# Patient Record
Sex: MALE | Race: WHITE | NOT HISPANIC OR LATINO | Employment: FULL TIME | RURAL
[De-identification: names, ages, dates, MRNs, and addresses within clinical notes are randomized per-mention and may not be internally consistent; named-entity substitution may affect disease eponyms.]

---

## 2021-04-22 ENCOUNTER — OFFICE VISIT (OUTPATIENT)
Dept: ORTHOPEDICS | Facility: CLINIC | Age: 61
End: 2021-04-22
Payer: COMMERCIAL

## 2021-04-22 DIAGNOSIS — M19.011 OSTEOARTHRITIS OF RIGHT GLENOHUMERAL JOINT: Primary | ICD-10-CM

## 2021-04-22 DIAGNOSIS — M19.012 OSTEOARTHRITIS OF LEFT SHOULDER, UNSPECIFIED OSTEOARTHRITIS TYPE: ICD-10-CM

## 2021-04-22 DIAGNOSIS — M25.512 LEFT SHOULDER PAIN: ICD-10-CM

## 2021-04-22 PROCEDURE — 99213 PR OFFICE/OUTPT VISIT, EST, LEVL III, 20-29 MIN: ICD-10-PCS | Mod: 25,,, | Performed by: ORTHOPAEDIC SURGERY

## 2021-04-22 PROCEDURE — 20610 DRAIN/INJ JOINT/BURSA W/O US: CPT | Mod: LT,,, | Performed by: ORTHOPAEDIC SURGERY

## 2021-04-22 PROCEDURE — 99213 OFFICE O/P EST LOW 20 MIN: CPT | Mod: 25,,, | Performed by: ORTHOPAEDIC SURGERY

## 2021-04-22 PROCEDURE — 20610 LARGE JOINT ASPIRATION/INJECTION: L GLENOHUMERAL: ICD-10-PCS | Mod: LT,,, | Performed by: ORTHOPAEDIC SURGERY

## 2021-04-22 RX ORDER — BUPIVACAINE HYDROCHLORIDE 5 MG/ML
3 INJECTION, SOLUTION PERINEURAL
Status: DISCONTINUED | OUTPATIENT
Start: 2021-04-22 | End: 2021-04-22 | Stop reason: HOSPADM

## 2021-04-22 RX ORDER — TRIAMCINOLONE ACETONIDE 40 MG/ML
40 INJECTION, SUSPENSION INTRA-ARTICULAR; INTRAMUSCULAR
Status: DISCONTINUED | OUTPATIENT
Start: 2021-04-22 | End: 2021-04-22 | Stop reason: HOSPADM

## 2021-04-22 RX ORDER — BUPIVACAINE HYDROCHLORIDE 5 MG/ML
4 INJECTION, SOLUTION PERINEURAL
Status: DISCONTINUED | OUTPATIENT
Start: 2021-04-22 | End: 2021-04-22 | Stop reason: HOSPADM

## 2021-04-22 RX ORDER — MULTIVITAMIN
1 TABLET ORAL DAILY
COMMUNITY

## 2021-04-22 RX ADMIN — BUPIVACAINE HYDROCHLORIDE 4 ML: 5 INJECTION, SOLUTION PERINEURAL at 08:04

## 2021-04-22 RX ADMIN — BUPIVACAINE HYDROCHLORIDE 3 ML: 5 INJECTION, SOLUTION PERINEURAL at 08:04

## 2021-04-22 RX ADMIN — TRIAMCINOLONE ACETONIDE 40 MG: 40 INJECTION, SUSPENSION INTRA-ARTICULAR; INTRAMUSCULAR at 08:04

## 2021-06-02 DIAGNOSIS — M25.512 LEFT SHOULDER PAIN: Primary | ICD-10-CM

## 2021-06-03 ENCOUNTER — OFFICE VISIT (OUTPATIENT)
Dept: ORTHOPEDICS | Facility: CLINIC | Age: 61
End: 2021-06-03
Payer: COMMERCIAL

## 2021-06-03 ENCOUNTER — HOSPITAL ENCOUNTER (OUTPATIENT)
Dept: RADIOLOGY | Facility: HOSPITAL | Age: 61
Discharge: HOME | End: 2021-06-03
Attending: ORTHOPAEDIC SURGERY
Payer: COMMERCIAL

## 2021-06-03 VITALS — BODY MASS INDEX: 29.82 KG/M2 | WEIGHT: 225 LBS | HEIGHT: 73 IN

## 2021-06-03 DIAGNOSIS — M25.511 RIGHT SHOULDER PAIN: ICD-10-CM

## 2021-06-03 DIAGNOSIS — M19.012 GLENOHUMERAL ARTHRITIS, LEFT: ICD-10-CM

## 2021-06-03 DIAGNOSIS — M25.512 LEFT SHOULDER PAIN: ICD-10-CM

## 2021-06-03 DIAGNOSIS — M75.100 TEAR OF ROTATOR CUFF, UNSPECIFIED LATERALITY, UNSPECIFIED TEAR EXTENT, UNSPECIFIED WHETHER TRAUMATIC: Primary | ICD-10-CM

## 2021-06-03 PROCEDURE — 73030 X-RAY EXAM OF SHOULDER: CPT | Mod: 26,LT,, | Performed by: ORTHOPAEDIC SURGERY

## 2021-06-03 PROCEDURE — 99214 OFFICE O/P EST MOD 30 MIN: CPT | Mod: ,,, | Performed by: ORTHOPAEDIC SURGERY

## 2021-06-03 PROCEDURE — 99214 PR OFFICE/OUTPT VISIT, EST, LEVL IV, 30-39 MIN: ICD-10-PCS | Mod: ,,, | Performed by: ORTHOPAEDIC SURGERY

## 2021-06-03 PROCEDURE — 73030 X-RAY EXAM OF SHOULDER: CPT | Mod: TC,LT

## 2021-06-03 PROCEDURE — 73030 XR SHOULDER COMPLETE 2 OR MORE VIEWS LEFT: ICD-10-PCS | Mod: 26,LT,, | Performed by: ORTHOPAEDIC SURGERY

## 2021-06-22 ENCOUNTER — OFFICE VISIT (OUTPATIENT)
Dept: ORTHOPEDICS | Facility: CLINIC | Age: 61
End: 2021-06-22
Payer: COMMERCIAL

## 2021-06-22 DIAGNOSIS — M75.100 TEAR OF ROTATOR CUFF, UNSPECIFIED LATERALITY, UNSPECIFIED TEAR EXTENT, UNSPECIFIED WHETHER TRAUMATIC: Primary | ICD-10-CM

## 2021-06-22 PROCEDURE — 99213 OFFICE O/P EST LOW 20 MIN: CPT | Mod: ,,, | Performed by: ORTHOPAEDIC SURGERY

## 2021-06-22 PROCEDURE — 99213 PR OFFICE/OUTPT VISIT, EST, LEVL III, 20-29 MIN: ICD-10-PCS | Mod: ,,, | Performed by: ORTHOPAEDIC SURGERY

## 2021-10-12 ENCOUNTER — OFFICE VISIT (OUTPATIENT)
Dept: ORTHOPEDICS | Facility: CLINIC | Age: 61
End: 2021-10-12
Payer: COMMERCIAL

## 2021-10-12 DIAGNOSIS — M19.011 OSTEOARTHRITIS OF RIGHT GLENOHUMERAL JOINT: ICD-10-CM

## 2021-10-12 DIAGNOSIS — M75.100 TEAR OF ROTATOR CUFF, UNSPECIFIED LATERALITY, UNSPECIFIED TEAR EXTENT, UNSPECIFIED WHETHER TRAUMATIC: Primary | ICD-10-CM

## 2021-10-12 PROCEDURE — 99213 OFFICE O/P EST LOW 20 MIN: CPT | Mod: ,,, | Performed by: ORTHOPAEDIC SURGERY

## 2021-10-12 PROCEDURE — 99213 PR OFFICE/OUTPT VISIT, EST, LEVL III, 20-29 MIN: ICD-10-PCS | Mod: ,,, | Performed by: ORTHOPAEDIC SURGERY

## 2021-10-12 PROCEDURE — 1159F PR MEDICATION LIST DOCUMENTED IN MEDICAL RECORD: ICD-10-PCS | Mod: CPTII,,, | Performed by: ORTHOPAEDIC SURGERY

## 2021-10-12 PROCEDURE — 1159F MED LIST DOCD IN RCRD: CPT | Mod: CPTII,,, | Performed by: ORTHOPAEDIC SURGERY

## 2021-10-12 PROCEDURE — 4010F ACE/ARB THERAPY RXD/TAKEN: CPT | Mod: CPTII,,, | Performed by: ORTHOPAEDIC SURGERY

## 2021-10-12 PROCEDURE — 4010F PR ACE/ARB THEARPY RXD/TAKEN: ICD-10-PCS | Mod: CPTII,,, | Performed by: ORTHOPAEDIC SURGERY

## 2021-10-19 ENCOUNTER — OFFICE VISIT (OUTPATIENT)
Dept: ORTHOPEDICS | Facility: CLINIC | Age: 61
End: 2021-10-19
Payer: COMMERCIAL

## 2021-10-19 DIAGNOSIS — M19.011 OSTEOARTHRITIS OF RIGHT GLENOHUMERAL JOINT: ICD-10-CM

## 2021-10-19 DIAGNOSIS — M75.100 TEAR OF ROTATOR CUFF, UNSPECIFIED LATERALITY, UNSPECIFIED TEAR EXTENT, UNSPECIFIED WHETHER TRAUMATIC: Primary | ICD-10-CM

## 2021-10-19 PROCEDURE — 20610 DRAIN/INJ JOINT/BURSA W/O US: CPT | Mod: LT,,, | Performed by: ORTHOPAEDIC SURGERY

## 2021-10-19 PROCEDURE — 20610 PR DRAIN/INJECT LARGE JOINT/BURSA: ICD-10-PCS | Mod: LT,,, | Performed by: ORTHOPAEDIC SURGERY

## 2021-10-19 PROCEDURE — 0232T NJX PLATELET PLASMA: CPT | Mod: 59,,, | Performed by: ORTHOPAEDIC SURGERY

## 2021-10-19 PROCEDURE — 0232T PR INJECT PLATELET PLASMA W/IMG HARVEST/PREPARATOIN: ICD-10-PCS | Mod: 59,,, | Performed by: ORTHOPAEDIC SURGERY

## 2021-10-19 RX ORDER — TRIAMCINOLONE ACETONIDE 40 MG/ML
40 INJECTION, SUSPENSION INTRA-ARTICULAR; INTRAMUSCULAR
Status: DISCONTINUED | OUTPATIENT
Start: 2021-10-19 | End: 2021-10-19 | Stop reason: HOSPADM

## 2021-10-19 RX ORDER — BUPIVACAINE HYDROCHLORIDE 5 MG/ML
5 INJECTION, SOLUTION PERINEURAL
Status: DISCONTINUED | OUTPATIENT
Start: 2021-10-19 | End: 2021-10-19 | Stop reason: HOSPADM

## 2021-10-19 RX ADMIN — BUPIVACAINE HYDROCHLORIDE 5 ML: 5 INJECTION, SOLUTION PERINEURAL at 10:10

## 2021-10-19 RX ADMIN — TRIAMCINOLONE ACETONIDE 40 MG: 40 INJECTION, SUSPENSION INTRA-ARTICULAR; INTRAMUSCULAR at 10:10

## 2022-04-29 DIAGNOSIS — M25.512 ACUTE PAIN OF LEFT SHOULDER: Primary | ICD-10-CM

## 2022-04-29 DIAGNOSIS — M79.672 PAIN OF LEFT HEEL: ICD-10-CM

## 2022-05-03 ENCOUNTER — HOSPITAL ENCOUNTER (OUTPATIENT)
Dept: RADIOLOGY | Facility: HOSPITAL | Age: 62
Discharge: HOME | End: 2022-05-03
Attending: ORTHOPAEDIC SURGERY
Payer: COMMERCIAL

## 2022-05-03 ENCOUNTER — OFFICE VISIT (OUTPATIENT)
Dept: ORTHOPEDICS | Facility: CLINIC | Age: 62
End: 2022-05-03
Payer: COMMERCIAL

## 2022-05-03 DIAGNOSIS — M72.2 PLANTAR FASCIITIS OF RIGHT FOOT: Primary | ICD-10-CM

## 2022-05-03 DIAGNOSIS — M25.512 ACUTE PAIN OF LEFT SHOULDER: ICD-10-CM

## 2022-05-03 DIAGNOSIS — M19.012 GLENOHUMERAL ARTHRITIS, LEFT: ICD-10-CM

## 2022-05-03 DIAGNOSIS — M79.672 PAIN OF LEFT HEEL: ICD-10-CM

## 2022-05-03 PROCEDURE — 73030 XR SHOULDER COMPLETE 2 OR MORE VIEWS LEFT: ICD-10-PCS | Mod: 26,LT,, | Performed by: ORTHOPAEDIC SURGERY

## 2022-05-03 PROCEDURE — 20610 DRAIN/INJ JOINT/BURSA W/O US: CPT | Mod: LT,,, | Performed by: ORTHOPAEDIC SURGERY

## 2022-05-03 PROCEDURE — 99215 OFFICE O/P EST HI 40 MIN: CPT | Mod: 25,,, | Performed by: ORTHOPAEDIC SURGERY

## 2022-05-03 PROCEDURE — 73650 XR CALCANEUS 2 VIEW RIGHT: ICD-10-PCS | Mod: 26,LT,, | Performed by: ORTHOPAEDIC SURGERY

## 2022-05-03 PROCEDURE — 20550 TENDON ORIGIN: ICD-10-PCS | Mod: 59,,, | Performed by: ORTHOPAEDIC SURGERY

## 2022-05-03 PROCEDURE — 99215 PR OFFICE/OUTPT VISIT, EST, LEVL V, 40-54 MIN: ICD-10-PCS | Mod: 25,,, | Performed by: ORTHOPAEDIC SURGERY

## 2022-05-03 PROCEDURE — 73030 X-RAY EXAM OF SHOULDER: CPT | Mod: TC,LT

## 2022-05-03 PROCEDURE — 20610 LARGE JOINT ASPIRATION/INJECTION: L GLENOHUMERAL: ICD-10-PCS | Mod: LT,,, | Performed by: ORTHOPAEDIC SURGERY

## 2022-05-03 PROCEDURE — 73650 X-RAY EXAM OF HEEL: CPT | Mod: TC,RT

## 2022-05-03 PROCEDURE — 20550 NJX 1 TENDON SHEATH/LIGAMENT: CPT | Mod: 59,,, | Performed by: ORTHOPAEDIC SURGERY

## 2022-05-03 PROCEDURE — 4010F PR ACE/ARB THEARPY RXD/TAKEN: ICD-10-PCS | Mod: CPTII,,, | Performed by: ORTHOPAEDIC SURGERY

## 2022-05-03 PROCEDURE — 73650 X-RAY EXAM OF HEEL: CPT | Mod: 26,LT,, | Performed by: ORTHOPAEDIC SURGERY

## 2022-05-03 PROCEDURE — 4010F ACE/ARB THERAPY RXD/TAKEN: CPT | Mod: CPTII,,, | Performed by: ORTHOPAEDIC SURGERY

## 2022-05-03 PROCEDURE — 73030 X-RAY EXAM OF SHOULDER: CPT | Mod: 26,LT,, | Performed by: ORTHOPAEDIC SURGERY

## 2022-05-03 RX ADMIN — BUPIVACAINE HYDROCHLORIDE 5 ML: 5 INJECTION, SOLUTION PERINEURAL at 02:05

## 2022-05-03 RX ADMIN — TRIAMCINOLONE ACETONIDE 20 MG: 40 INJECTION, SUSPENSION INTRA-ARTICULAR; INTRAMUSCULAR at 02:05

## 2022-05-03 RX ADMIN — TRIAMCINOLONE ACETONIDE 40 MG: 40 INJECTION, SUSPENSION INTRA-ARTICULAR; INTRAMUSCULAR at 02:05

## 2022-05-03 NOTE — PROGRESS NOTES
61 y.o. Male returns to clinic for a follow up visit regarding     ICD-10-CM ICD-9-CM   1. Plantar fasciitis of right foot  M72.2 728.71   2. Glenohumeral arthritis, left  M19.012 716.91        Pt is here for recheck left shoulder pain, he was given bilateral injection in October which did help for few months, he has had a PRP injection in his right shoulder which gave him good relief.  He is complaining of new onset right foot pain today.  Has been diagnosed with plantar fasciitis in the past.  Believes is likely a recurrence of his plantar fasciitis.       Past Medical History:   Diagnosis Date    High cholesterol     Hypertension     Thyroid cancer      Past Surgical History:   Procedure Laterality Date    THYROID SURGERY      TOTAL KNEE ARTHROPLASTY Right     TUMOR REMOVAL Left     left thigh         REVIEW OF SYSTEMS:   Constitution: Negative. Negative for chills, fever and night sweats.    Hematologic/Lymphatic: Negative for bleeding problem. Does not bruise/bleed easily.   Skin: Negative for dry skin, itching and rash.   Musculoskeletal: Negative for falls. Positive for knee pain and muscle weakness.   All other review of symptoms were reviewed and found to be noncontributory.     PHYSICAL EXAMINATION:  There were no vitals taken for this visit.  General    Constitutional: He is oriented to person, place, and time. He appears well-developed and well-nourished. No distress.   HENT:   Head: Normocephalic and atraumatic.   Eyes: Pupils are equal, round, and reactive to light.   Cardiovascular: Normal rate, regular rhythm and intact distal pulses.    Pulmonary/Chest: Effort normal. No respiratory distress. He exhibits no tenderness.   Abdominal: Soft. Bowel sounds are normal. He exhibits no distension. There is no abdominal tenderness.   Neurological: He is alert and oriented to person, place, and time. He has normal reflexes. He displays normal reflexes. He exhibits normal muscle tone.  Coordination normal.   Psychiatric: He has a normal mood and affect. His behavior is normal. Judgment and thought content normal.             Left Shoulder Exam     Tenderness   The patient is tender to palpation of the acromioclavicular joint and supraspinatus.    Tests & Signs   Drop arm: positive  Lag Sign 0 degrees: positive    Comments:  Patient demonstrates evidence of pseudoparalysis with limited active forward elevation      Muscle Strength   Left Upper Extremity  Shoulder Internal Rotation: 3/5   Shoulder External Rotation: 3/5   Supraspinatus: 3/5   Subscapularis: 3/5         IMAGING:  X-Ray Calcaneus 2 View Right    Result Date: 5/3/2022  See Procedure Notes for results. IMPRESSION: Please see Ortho procedure notes for report.  This procedure was auto-finalized by: Virtual Radiologist  Radiographs of the right foot were obtained today demonstrating no significant evidence of fracture dislocation or pathologic bone.  Mild spurring seen along the Achilles insertion and at the plantar fascia origin.      X-Ray Shoulder 2 or More Views Left    Result Date: 5/3/2022  See Procedure Notes for results. IMPRESSION: Please see Ortho procedure notes for report.  This procedure was auto-finalized by: Virtual Radiologist     Radiographs left shoulder obtained today demonstrating severe glenohumeral osteoarthritis with bone-on-bone degenerative changes of the glenohumeral joint.  ASSESSMENT:      ICD-10-CM ICD-9-CM   1. Plantar fasciitis of right foot  M72.2 728.71   2. Glenohumeral arthritis, left  M19.012 716.91       PLAN:     -Findings and treatment options were discussed with the patient  -All questions answered      We discussed surgery on his left shoulder.  This could be an anatomic shoulder replacement versus a reverse shoulder replacement.  This was to pinned on the glenoid morphology based on the CT scan.  He wishes to postpone shoulder surgery due to his dire need for lumbar spine surgery in the future.  We  treated his shoulder with a intra-articular injection today and his plantar fascia with an injection.  Please see the attached procedure note.  Will follow back up in the near future when he wishes to have his shoulder addressed via surgery.    Patient Instructions   CT scan left shoulder with blueprint protocol    Injection R foot today (plantar fasciitis        No orders of the defined types were placed in this encounter.        Large Joint Aspiration/Injection: L glenohumeral    Date/Time: 5/3/2022 2:00 PM  Performed by: Silvio Frye MD  Authorized by: Silvio Frye MD     Consent Done?:  Yes (Verbal)  Indications:  Pain  Timeout: prior to procedure the correct patient, procedure, and site was verified      Local anesthesia used?: Yes      Details:  Needle Size:  22 G  Approach:  Anterior  Location:  Shoulder  Site:  L glenohumeral  Medications:  5 mL BUPivacaine 0.5 % (5 mg/mL); 40 mg triamcinolone acetonide 40 mg/mL  Patient tolerance:  Patient tolerated the procedure well with no immediate complications    Tendon Origin    Date/Time: 5/3/2022 2:00 PM  Performed by: Silvio Frye MD  Authorized by: Silvio Frye MD     Needle size:  22 G  Medications:  20 mg triamcinolone acetonide 40 mg/mL   The right plantar fascia origin was injected with a 0.5 cc of 40 Kenalog and 0.5 cc of 0.25% bupivacaine.  This was done under sterile technique without complication.

## 2022-05-04 RX ORDER — TRIAMCINOLONE ACETONIDE 40 MG/ML
20 INJECTION, SUSPENSION INTRA-ARTICULAR; INTRAMUSCULAR
Status: DISCONTINUED | OUTPATIENT
Start: 2022-05-03 | End: 2022-05-04 | Stop reason: HOSPADM

## 2022-05-04 RX ORDER — BUPIVACAINE HYDROCHLORIDE 5 MG/ML
5 INJECTION, SOLUTION PERINEURAL
Status: DISCONTINUED | OUTPATIENT
Start: 2022-05-03 | End: 2022-05-04 | Stop reason: HOSPADM

## 2022-05-04 RX ORDER — TRIAMCINOLONE ACETONIDE 40 MG/ML
40 INJECTION, SUSPENSION INTRA-ARTICULAR; INTRAMUSCULAR
Status: DISCONTINUED | OUTPATIENT
Start: 2022-05-03 | End: 2022-05-04 | Stop reason: HOSPADM

## 2022-09-28 DIAGNOSIS — M25.512 BILATERAL SHOULDER PAIN, UNSPECIFIED CHRONICITY: Primary | ICD-10-CM

## 2022-09-28 DIAGNOSIS — M25.511 BILATERAL SHOULDER PAIN, UNSPECIFIED CHRONICITY: Primary | ICD-10-CM

## 2022-09-29 ENCOUNTER — HOSPITAL ENCOUNTER (OUTPATIENT)
Dept: RADIOLOGY | Facility: HOSPITAL | Age: 62
Discharge: HOME | End: 2022-09-29
Attending: ORTHOPAEDIC SURGERY
Payer: COMMERCIAL

## 2022-09-29 ENCOUNTER — OFFICE VISIT (OUTPATIENT)
Dept: ORTHOPEDICS | Facility: CLINIC | Age: 62
End: 2022-09-29
Payer: COMMERCIAL

## 2022-09-29 DIAGNOSIS — M25.512 BILATERAL SHOULDER PAIN, UNSPECIFIED CHRONICITY: ICD-10-CM

## 2022-09-29 DIAGNOSIS — M25.511 BILATERAL SHOULDER PAIN, UNSPECIFIED CHRONICITY: ICD-10-CM

## 2022-09-29 DIAGNOSIS — M19.012 GLENOHUMERAL ARTHRITIS, LEFT: Primary | ICD-10-CM

## 2022-09-29 PROCEDURE — 99214 OFFICE O/P EST MOD 30 MIN: CPT | Mod: 25,,, | Performed by: ORTHOPAEDIC SURGERY

## 2022-09-29 PROCEDURE — 1160F RVW MEDS BY RX/DR IN RCRD: CPT | Mod: CPTII,,, | Performed by: ORTHOPAEDIC SURGERY

## 2022-09-29 PROCEDURE — 4010F PR ACE/ARB THEARPY RXD/TAKEN: ICD-10-PCS | Mod: CPTII,,, | Performed by: ORTHOPAEDIC SURGERY

## 2022-09-29 PROCEDURE — 1159F PR MEDICATION LIST DOCUMENTED IN MEDICAL RECORD: ICD-10-PCS | Mod: CPTII,,, | Performed by: ORTHOPAEDIC SURGERY

## 2022-09-29 PROCEDURE — 73030 XR SHOULDER COMPLETE 2 OR MORE VIEWS BILATERAL: ICD-10-PCS | Mod: 26,50,, | Performed by: ORTHOPAEDIC SURGERY

## 2022-09-29 PROCEDURE — 20610 DRAIN/INJ JOINT/BURSA W/O US: CPT | Mod: 50,,, | Performed by: ORTHOPAEDIC SURGERY

## 2022-09-29 PROCEDURE — 20610 LARGE JOINT ASPIRATION/INJECTION: L GLENOHUMERAL: ICD-10-PCS | Mod: 50,,, | Performed by: ORTHOPAEDIC SURGERY

## 2022-09-29 PROCEDURE — 4010F ACE/ARB THERAPY RXD/TAKEN: CPT | Mod: CPTII,,, | Performed by: ORTHOPAEDIC SURGERY

## 2022-09-29 PROCEDURE — 1160F PR REVIEW ALL MEDS BY PRESCRIBER/CLIN PHARMACIST DOCUMENTED: ICD-10-PCS | Mod: CPTII,,, | Performed by: ORTHOPAEDIC SURGERY

## 2022-09-29 PROCEDURE — 73030 X-RAY EXAM OF SHOULDER: CPT | Mod: TC,50

## 2022-09-29 PROCEDURE — 1159F MED LIST DOCD IN RCRD: CPT | Mod: CPTII,,, | Performed by: ORTHOPAEDIC SURGERY

## 2022-09-29 PROCEDURE — 99214 PR OFFICE/OUTPT VISIT, EST, LEVL IV, 30-39 MIN: ICD-10-PCS | Mod: 25,,, | Performed by: ORTHOPAEDIC SURGERY

## 2022-09-29 PROCEDURE — 73030 X-RAY EXAM OF SHOULDER: CPT | Mod: 26,50,, | Performed by: ORTHOPAEDIC SURGERY

## 2022-09-29 RX ORDER — ASPIRIN 81 MG/1
81 TABLET ORAL
COMMUNITY

## 2022-09-29 RX ORDER — OMEPRAZOLE 40 MG/1
CAPSULE, DELAYED RELEASE ORAL
COMMUNITY

## 2022-09-29 RX ORDER — TRIAMCINOLONE ACETONIDE 40 MG/ML
40 INJECTION, SUSPENSION INTRA-ARTICULAR; INTRAMUSCULAR
COMMUNITY

## 2022-09-29 RX ORDER — GABAPENTIN 300 MG/1
CAPSULE ORAL
COMMUNITY

## 2022-09-29 RX ORDER — BUPIVACAINE HYDROCHLORIDE 5 MG/ML
5 INJECTION, SOLUTION PERINEURAL
Status: DISCONTINUED | OUTPATIENT
Start: 2022-09-29 | End: 2022-09-29 | Stop reason: HOSPADM

## 2022-09-29 RX ORDER — AMLODIPINE BESYLATE 5 MG/1
TABLET ORAL
COMMUNITY

## 2022-09-29 RX ORDER — DICLOFENAC SODIUM 10 MG/G
GEL TOPICAL
COMMUNITY

## 2022-09-29 RX ORDER — FLUTICASONE PROPIONATE 50 MCG
SPRAY, SUSPENSION (ML) NASAL
COMMUNITY

## 2022-09-29 RX ORDER — CEFDINIR 300 MG/1
300 CAPSULE ORAL 2 TIMES DAILY
COMMUNITY
Start: 2022-05-12

## 2022-09-29 RX ORDER — TRIAMCINOLONE ACETONIDE 40 MG/ML
40 INJECTION, SUSPENSION INTRA-ARTICULAR; INTRAMUSCULAR
Status: DISCONTINUED | OUTPATIENT
Start: 2022-09-29 | End: 2022-09-29 | Stop reason: HOSPADM

## 2022-09-29 RX ADMIN — TRIAMCINOLONE ACETONIDE 40 MG: 40 INJECTION, SUSPENSION INTRA-ARTICULAR; INTRAMUSCULAR at 10:09

## 2022-09-29 RX ADMIN — BUPIVACAINE HYDROCHLORIDE 5 ML: 5 INJECTION, SOLUTION PERINEURAL at 10:09

## 2022-09-29 NOTE — PROGRESS NOTES
CC:  Shoulder pain  61 y.o. Male returns to clinic for a follow up visit regarding     ICD-10-CM ICD-9-CM   1. Glenohumeral arthritis, left  M19.012 716.91   2. Bilateral shoulder pain, unspecified chronicity  M25.511 719.41    M25.512        Patient is still having pain in his shoulders. He has had injections in the past with good success. He would like to get another injection today.        REVIEW OF SYSTEMS:   Constitution: Negative. Negative for chills, fever and night sweats.    Hematologic/Lymphatic: Negative for bleeding problem. Does not bruise/bleed easily.   Skin: Negative for dry skin, itching and rash.   Musculoskeletal: Negative for falls. Positive for hip pain and muscle weakness.   All other review of symptoms were reviewed and found to be noncontributory.   PAST MEDICAL HISTORY:   Past Medical History:   Diagnosis Date    High cholesterol     Hypertension     Thyroid cancer      PAST SURGICAL HISTORY:   Past Surgical History:   Procedure Laterality Date    THYROID SURGERY      TOTAL KNEE ARTHROPLASTY Right     TUMOR REMOVAL Left     left thigh         PHYSICAL EXAMINATION:  General    Nursing note and vitals reviewed.  Constitutional: He is oriented to person, place, and time. He appears well-developed and well-nourished. No distress.   HENT:   Head: Normocephalic and atraumatic.   Eyes: Pupils are equal, round, and reactive to light.   Neck: Neck supple.   Cardiovascular:  Normal rate, regular rhythm and intact distal pulses.            Pulmonary/Chest: Effort normal. No respiratory distress. He exhibits no tenderness.   Abdominal: Soft. Bowel sounds are normal. He exhibits no distension. There is no abdominal tenderness.   Neurological: He is alert and oriented to person, place, and time. He has normal reflexes. He displays normal reflexes. No cranial nerve deficit. He exhibits normal muscle tone. Coordination normal.   Psychiatric: He has a normal mood and affect. His behavior is normal.  Judgment and thought content normal.         Right Shoulder Exam     Inspection/Observation   Swelling: present  Scapular Dyskinesia: positive    Tenderness   The patient is tender to palpation of the acromioclavicular joint, supraspinatus, greater tuberosity and biceps tendon.    Crepitus   The patient has crepitus of the AC joint and greater tuberosity.    Range of Motion   Active abduction:  abnormal   Extension:  abnormal   Forward Flexion:  abnormal   Forward Elevation: abnormal  Adduction: abnormal    Tests & Signs   Cross arm: positive  Drop arm: positive  Alcocer test: positive  Impingement: positive  Sulcus: absent  Rotator Cuff Painful Arc/Range: moderate  Anterosuperior Escape: negative  Lag Sign 90 degrees: positive  Lift Off Sign: positive  Belly Press: positive  Bear Hug: positive  Jerk Test: negative    Other   Sensation: normal    Comments:  Patient demonstrates evidence of pseudoparalysis with limited active forward elevation    Left Shoulder Exam   Left shoulder exam is normal.    Tenderness   The patient is tender to palpation of the acromioclavicular joint and supraspinatus.    Tests & Signs   Drop arm: positive  Lag Sign 0 degrees: positive     Comments:  Patient demonstrates evidence of pseudoparalysis with limited active forward elevation      Muscle Strength   Right Upper Extremity   Supraspinatus: 3/5   Subscapularis: 3/5   Biceps: 4/5   Left Upper Extremity  Shoulder Internal Rotation: 3/5   Shoulder External Rotation: 3/5   Supraspinatus: 3/5   Subscapularis: 3/5     Reflexes     Right Side   Right Davies's Sign:  absent    Vascular Exam     Right Pulses      Radial:                    2+        IMAGING:  X-Ray Shoulder 2 or more views Bilat    Result Date: 9/29/2022  See Procedure Notes for results. IMPRESSION: Please see Ortho procedure notes for report.  This procedure was auto-finalized by: Virtual Radiologist     Radiographs of bilateral shoulders were obtained today demonstrating  severe degenerative changes within the left glenohumeral joint moderate degenerative changes seen within the right glenohumeral joint    ASSESSMENT:      ICD-10-CM ICD-9-CM   1. Glenohumeral arthritis, left  M19.012 716.91   2. Bilateral shoulder pain, unspecified chronicity  M25.511 719.41    M25.512        PLAN:     -Findings and treatment options were discussed with the patient  -All questions answered  Natural history and expected course discussed. Questions answered.  Educational material distributed.  Reduction in offending activity.  Gentle ROM exercises.  Shoulder injection. See procedure note.  We gave him injections today.  He has a lumbar spine procedure which is scheduled in the near future.  Hold off any surgical intervention for his left shoulder and right shoulder at this time will follow up with us on an as-needed basis.      There are no Patient Instructions on file for this visit.    Orders Placed This Encounter   Procedures    Large Joint Aspiration/Injection: L glenohumeral    Large Joint Aspiration/Injection: R subacromial bursa         Large Joint Aspiration/Injection: L glenohumeral    Date/Time: 9/29/2022 10:15 AM  Performed by: Silvio Frye MD  Authorized by: Silvio Frye MD     Consent Done?:  Yes (Verbal)  Indications:  Pain  Timeout: prior to procedure the correct patient, procedure, and site was verified      Local anesthesia used?: Yes      Details:  Needle Size:  22 G  Approach:  Anterior  Location:  Shoulder  Site:  L glenohumeral  Medications:  5 mL BUPivacaine 0.5 % (5 mg/mL); 40 mg triamcinolone acetonide 40 mg/mL  Patient tolerance:  Patient tolerated the procedure well with no immediate complications  Large Joint Aspiration/Injection: R subacromial bursa    Date/Time: 9/29/2022 10:15 AM  Performed by: Silvio Frye MD  Authorized by: Silvio Frye MD     Consent Done?:  Yes (Verbal)  Indications:  Pain  Site marked: the procedure site was marked    Local anesthetic:  Bupivacaine  0.25% without epinephrine (4 cc's of 0.25% bupivicaine)    Details:  Needle Size:  22 G  Approach:  Posterior  Location:  Shoulder  Site:  R subacromial bursa  Patient tolerance:  Patient tolerated the procedure well with no immediate complications     After obtaining 50 mL of peripheral blood this was bone in the center fusion 5 mL of platelet rich plasma were obtained and injected the right subacromial space under sterile technique without complication

## 2023-01-25 ENCOUNTER — ATHLETIC TRAINING SESSION (OUTPATIENT)
Dept: SPORTS MEDICINE | Facility: CLINIC | Age: 63
End: 2023-01-25

## 2023-01-26 ENCOUNTER — OFFICE VISIT (OUTPATIENT)
Dept: ORTHOPEDICS | Facility: CLINIC | Age: 63
End: 2023-01-26
Payer: COMMERCIAL

## 2023-01-26 DIAGNOSIS — M25.511 BILATERAL SHOULDER PAIN, UNSPECIFIED CHRONICITY: Primary | ICD-10-CM

## 2023-01-26 DIAGNOSIS — M25.512 BILATERAL SHOULDER PAIN, UNSPECIFIED CHRONICITY: Primary | ICD-10-CM

## 2023-01-26 DIAGNOSIS — M19.012 GLENOHUMERAL ARTHRITIS, LEFT: ICD-10-CM

## 2023-01-26 PROCEDURE — 20610 LARGE JOINT ASPIRATION/INJECTION: R SUBACROMIAL BURSA: ICD-10-PCS | Mod: S$PBB,50,, | Performed by: ORTHOPAEDIC SURGERY

## 2023-01-26 PROCEDURE — 20610 DRAIN/INJ JOINT/BURSA W/O US: CPT | Mod: PBBFAC | Performed by: ORTHOPAEDIC SURGERY

## 2023-01-26 PROCEDURE — 99212 OFFICE O/P EST SF 10 MIN: CPT | Mod: PBBFAC,25 | Performed by: ORTHOPAEDIC SURGERY

## 2023-01-26 PROCEDURE — 99213 PR OFFICE/OUTPT VISIT, EST, LEVL III, 20-29 MIN: ICD-10-PCS | Mod: S$PBB,25,, | Performed by: ORTHOPAEDIC SURGERY

## 2023-01-26 PROCEDURE — 99213 OFFICE O/P EST LOW 20 MIN: CPT | Mod: S$PBB,25,, | Performed by: ORTHOPAEDIC SURGERY

## 2023-01-26 RX ORDER — TRIAMCINOLONE ACETONIDE 40 MG/ML
40 INJECTION, SUSPENSION INTRA-ARTICULAR; INTRAMUSCULAR
Status: DISCONTINUED | OUTPATIENT
Start: 2023-01-26 | End: 2023-01-26 | Stop reason: HOSPADM

## 2023-01-26 RX ORDER — BUPIVACAINE HYDROCHLORIDE 5 MG/ML
5 INJECTION, SOLUTION PERINEURAL
Status: DISCONTINUED | OUTPATIENT
Start: 2023-01-26 | End: 2023-01-26 | Stop reason: HOSPADM

## 2023-01-26 RX ORDER — TRAMADOL HYDROCHLORIDE 50 MG/1
TABLET ORAL
COMMUNITY

## 2023-01-26 RX ORDER — CLOPIDOGREL BISULFATE 75 MG/1
75 TABLET ORAL DAILY
COMMUNITY

## 2023-01-26 RX ORDER — METOPROLOL TARTRATE 50 MG/1
50 TABLET ORAL 2 TIMES DAILY
COMMUNITY
Start: 2022-12-22

## 2023-01-26 RX ORDER — LORAZEPAM 0.5 MG/1
0.5 TABLET ORAL EVERY 12 HOURS PRN
COMMUNITY
Start: 2022-12-28

## 2023-01-26 RX ORDER — TRIAMCINOLONE ACETONIDE 40 MG/ML
80 INJECTION, SUSPENSION INTRA-ARTICULAR; INTRAMUSCULAR
Status: DISCONTINUED | OUTPATIENT
Start: 2023-01-26 | End: 2023-01-26 | Stop reason: HOSPADM

## 2023-01-26 RX ADMIN — BUPIVACAINE HYDROCHLORIDE 5 ML: 5 INJECTION, SOLUTION PERINEURAL at 01:01

## 2023-01-26 RX ADMIN — TRIAMCINOLONE ACETONIDE 80 MG: 400 INJECTION, SUSPENSION INTRA-ARTICULAR; INTRAMUSCULAR at 01:01

## 2023-01-26 RX ADMIN — TRIAMCINOLONE ACETONIDE 40 MG: 400 INJECTION, SUSPENSION INTRA-ARTICULAR; INTRAMUSCULAR at 01:01

## 2023-01-26 NOTE — PROGRESS NOTES
Subjective:          Chief Complaint: Denny Leon is a 62 y.o. male student at  who had no chief complaint listed for this encounter.    R shoulder pain-Referred to Dr. Silvio GUZMAN                Objective:        General: Denny is well-developed, well-nourished, appears stated age, in no acute distress, alert and oriented to time, place and person.     AT Session            Assessment:                 Plan:         1. RICE  2. Physician Referral: yes  3. ED Referral: no  4. Parent/Guardian Notified: 5. All questions were answered, ath. will contact me for questions or concerns in  the interim.  6.         Eligible to use School Insurance:

## 2023-01-26 NOTE — PROGRESS NOTES
Denny Leon returns to clinic for a follow up visit regarding     ICD-10-CM ICD-9-CM   1. Bilateral shoulder pain, unspecified chronicity  M25.511 719.41    M25.512    2. Glenohumeral arthritis, left  M19.012 716.91       Patient reports his shoulder pain has returned over the past month. He is having trouble sleeping at night because of the pain.   He had a PRP in the right and a steroid in the left in September 2022.   He had good relief and would like to get another injection in his shoulders      PAST MEDICAL HISTORY:   Past Medical History:   Diagnosis Date    High cholesterol     Hypertension     Thyroid cancer      PAST SURGICAL HISTORY:   Past Surgical History:   Procedure Laterality Date    THYROID SURGERY      TOTAL KNEE ARTHROPLASTY Right     TUMOR REMOVAL Left     left thigh         PHYSICAL EXAMINATION:  General    Nursing note and vitals reviewed.  Constitutional: He is oriented to person, place, and time. He appears well-developed and well-nourished. No distress.   HENT:   Head: Normocephalic and atraumatic.   Eyes: Pupils are equal, round, and reactive to light.   Neck: Neck supple.   Cardiovascular:  Normal rate, regular rhythm and intact distal pulses.            Pulmonary/Chest: Effort normal and breath sounds normal. No respiratory distress. He exhibits no tenderness.   Abdominal: Soft. Bowel sounds are normal. He exhibits no distension. There is no abdominal tenderness.   Neurological: He is alert and oriented to person, place, and time. He has normal reflexes. He displays normal reflexes. No cranial nerve deficit. He exhibits normal muscle tone. Coordination normal.   Psychiatric: He has a normal mood and affect. His behavior is normal. Judgment and thought content normal.         Right Shoulder Exam     Inspection/Observation   Swelling: absent  Bruising: absent  Scapular Dyskinesia: positive    Tenderness   The patient is tender to palpation of the greater tuberosity, acromion and  acromioclavicular joint.    Range of Motion   Active abduction:  abnormal   Passive abduction:  abnormal   Forward Flexion:  abnormal   Forward Elevation: abnormal  External Rotation 90 degrees: normal    Tests & Signs   Apprehension: negative  Cross arm: positive  Drop arm: negative  Alcocer test: positive  Impingement: positive  Rotator Cuff Painful Arc/Range: mild  Lag Sign 90 degrees: negative  Lift Off Sign: positive  Belly Press: positive  Active Compression Test ([place]'s Sign): positive  Jerk Test: negative    Other   Sensation: normal    Comments:  Pain with dynamic labral shear test.  There is pain and weakness with Whipple testing.     Left Shoulder Exam   Left shoulder exam is normal.    Tenderness   The patient is tender to palpation of the acromioclavicular joint and supraspinatus.    Tests & Signs   Drop arm: positive  Lag Sign 0 degrees: positive     Comments:  Patient demonstrates evidence of pseudoparalysis with limited active forward elevation      Muscle Strength   Right Upper Extremity   Shoulder External Rotation: 4/5   Supraspinatus: 4/5   Subscapularis: 4/5   Biceps: 4/5   Left Upper Extremity  Shoulder Internal Rotation: 3/5   Shoulder External Rotation: 3/5   Supraspinatus: 3/5   Subscapularis: 3/5     Reflexes     Right Side   Biceps:  2+  Right Davies's Sign:  absent    Vascular Exam     Right Pulses      Radial:                    2+      Capillary Refill  Right Hand: normal capillary refill          IMAGING:  No results found.       ASSESSMENT:      ICD-10-CM ICD-9-CM   1. Bilateral shoulder pain, unspecified chronicity  M25.511 719.41    M25.512    2. Glenohumeral arthritis, left  M19.012 716.91       PLAN:     -Findings and treatment options were discussed with the patient  -All questions answered  Natural history and expected course discussed. Questions answered.  Educational material distributed.      There are no Patient Instructions on file for this visit.    Orders Placed This  Encounter   Procedures    Large Joint Aspiration/Injection: R subacromial bursa    Large Joint Aspiration/Injection: L glenohumeral       Large Joint Aspiration/Injection: R subacromial bursa    Date/Time: 1/26/2023 1:00 PM  Performed by: Silvio Frye MD  Authorized by: Silvio Frye MD     Consent Done?:  Yes (Verbal)  Indications:  Pain  Site marked: the procedure site was marked    Local anesthetic:  Bupivacaine 0.25% without epinephrine (4 cc's of 0.25% bupivicaine)    Details:  Needle Size:  22 G  Approach:  Posterior  Location:  Shoulder  Site:  R subacromial bursa  Medications:  80 mg triamcinolone acetonide 40 mg/mL  Patient tolerance:  Patient tolerated the procedure well with no immediate complications  Large Joint Aspiration/Injection: L glenohumeral    Date/Time: 1/26/2023 1:00 PM  Performed by: Silvio Frye MD  Authorized by: Silvio Frye MD     Consent Done?:  Yes (Verbal)  Indications:  Pain  Timeout: prior to procedure the correct patient, procedure, and site was verified      Local anesthesia used?: Yes      Details:  Needle Size:  22 G  Approach:  Anterior  Location:  Shoulder  Site:  L glenohumeral  Medications:  5 mL BUPivacaine 0.5 % (5 mg/mL); 40 mg triamcinolone acetonide 40 mg/mL  Patient tolerance:  Patient tolerated the procedure well with no immediate complications

## 2023-07-27 ENCOUNTER — OFFICE VISIT (OUTPATIENT)
Dept: ORTHOPEDICS | Facility: CLINIC | Age: 63
End: 2023-07-27
Payer: COMMERCIAL

## 2023-07-27 DIAGNOSIS — M25.511 BILATERAL SHOULDER PAIN, UNSPECIFIED CHRONICITY: ICD-10-CM

## 2023-07-27 DIAGNOSIS — M25.512 BILATERAL SHOULDER PAIN, UNSPECIFIED CHRONICITY: ICD-10-CM

## 2023-07-27 DIAGNOSIS — M19.011 OSTEOARTHRITIS OF RIGHT GLENOHUMERAL JOINT: Primary | ICD-10-CM

## 2023-07-27 DIAGNOSIS — M18.11 ARTHRITIS OF CARPOMETACARPAL (CMC) JOINT OF RIGHT THUMB: ICD-10-CM

## 2023-07-27 DIAGNOSIS — M19.012 GLENOHUMERAL ARTHRITIS, LEFT: ICD-10-CM

## 2023-07-27 PROCEDURE — 99213 OFFICE O/P EST LOW 20 MIN: CPT | Mod: PBBFAC | Performed by: ORTHOPAEDIC SURGERY

## 2023-07-27 PROCEDURE — 20600 SMALL JOINT ASPIRATION/INJECTION: R THUMB CMC: ICD-10-PCS | Mod: S$PBB,51,RT, | Performed by: ORTHOPAEDIC SURGERY

## 2023-07-27 PROCEDURE — 20600 DRAIN/INJ JOINT/BURSA W/O US: CPT | Mod: PBBFAC,51,XS,RT | Performed by: ORTHOPAEDIC SURGERY

## 2023-07-27 PROCEDURE — 20610 DRAIN/INJ JOINT/BURSA W/O US: CPT | Mod: PBBFAC | Performed by: ORTHOPAEDIC SURGERY

## 2023-07-27 PROCEDURE — 20610 LARGE JOINT ASPIRATION/INJECTION: R GLENOHUMERAL: ICD-10-PCS | Mod: S$PBB,LT,, | Performed by: ORTHOPAEDIC SURGERY

## 2023-07-27 PROCEDURE — 99214 OFFICE O/P EST MOD 30 MIN: CPT | Mod: S$PBB,25,, | Performed by: ORTHOPAEDIC SURGERY

## 2023-07-27 PROCEDURE — 99214 PR OFFICE/OUTPT VISIT, EST, LEVL IV, 30-39 MIN: ICD-10-PCS | Mod: S$PBB,25,, | Performed by: ORTHOPAEDIC SURGERY

## 2023-07-27 RX ORDER — BUPIVACAINE HYDROCHLORIDE 5 MG/ML
5 INJECTION, SOLUTION PERINEURAL
Status: DISCONTINUED | OUTPATIENT
Start: 2023-07-27 | End: 2023-07-27 | Stop reason: HOSPADM

## 2023-07-27 RX ORDER — TRIAMCINOLONE ACETONIDE 40 MG/ML
20 INJECTION, SUSPENSION INTRA-ARTICULAR; INTRAMUSCULAR
Status: DISCONTINUED | OUTPATIENT
Start: 2023-07-27 | End: 2023-07-27 | Stop reason: HOSPADM

## 2023-07-27 RX ORDER — TRIAMCINOLONE ACETONIDE 40 MG/ML
80 INJECTION, SUSPENSION INTRA-ARTICULAR; INTRAMUSCULAR
Status: DISCONTINUED | OUTPATIENT
Start: 2023-07-27 | End: 2023-07-27 | Stop reason: HOSPADM

## 2023-07-27 RX ORDER — TRIAMCINOLONE ACETONIDE 40 MG/ML
40 INJECTION, SUSPENSION INTRA-ARTICULAR; INTRAMUSCULAR
Status: DISCONTINUED | OUTPATIENT
Start: 2023-07-27 | End: 2023-07-27 | Stop reason: HOSPADM

## 2023-07-27 RX ADMIN — TRIAMCINOLONE ACETONIDE 20 MG: 400 INJECTION, SUSPENSION INTRA-ARTICULAR; INTRAMUSCULAR at 01:07

## 2023-07-27 RX ADMIN — BUPIVACAINE HYDROCHLORIDE 5 ML: 5 INJECTION, SOLUTION PERINEURAL at 01:07

## 2023-07-27 RX ADMIN — TRIAMCINOLONE ACETONIDE 80 MG: 400 INJECTION, SUSPENSION INTRA-ARTICULAR; INTRAMUSCULAR at 01:07

## 2023-07-27 RX ADMIN — TRIAMCINOLONE ACETONIDE 40 MG: 400 INJECTION, SUSPENSION INTRA-ARTICULAR; INTRAMUSCULAR at 01:07

## 2023-07-27 NOTE — PROGRESS NOTES
Denny Leon returns to clinic for a follow up visit regarding     ICD-10-CM ICD-9-CM   1. Osteoarthritis of right glenohumeral joint  M19.011 715.91   2. Bilateral shoulder pain, unspecified chronicity  M25.511 719.41    M25.512    3. Glenohumeral arthritis, left  M19.012 716.91   4. Arthritis of carpometacarpal (CMC) joint of right thumb  M18.11 716.94       Patient has had multiple shoulder injections over the last 2 years with the last being in Jan. 2023.   He reports that the injections help his pain for a couple of months.   He is experiencing pain in his shoulder again.       PAST MEDICAL HISTORY:   Past Medical History:   Diagnosis Date    High cholesterol     Hypertension     Thyroid cancer      PAST SURGICAL HISTORY:   Past Surgical History:   Procedure Laterality Date    THYROID SURGERY      TOTAL KNEE ARTHROPLASTY Right     TUMOR REMOVAL Left     left thigh         PHYSICAL EXAMINATION:  General    Nursing note and vitals reviewed.  Constitutional: He is oriented to person, place, and time. He appears well-developed and well-nourished. No distress.   HENT:   Head: Normocephalic and atraumatic.   Eyes: Pupils are equal, round, and reactive to light.   Neck: Neck supple.   Cardiovascular:  Normal rate, regular rhythm and intact distal pulses.            Pulmonary/Chest: Effort normal and breath sounds normal. No respiratory distress. He exhibits no tenderness.   Abdominal: Soft. Bowel sounds are normal. He exhibits no distension. There is no abdominal tenderness.   Neurological: He is alert and oriented to person, place, and time. He has normal reflexes. He displays normal reflexes. No cranial nerve deficit. He exhibits normal muscle tone. Coordination normal.   Psychiatric: He has a normal mood and affect. His behavior is normal. Judgment and thought content normal.         Right Shoulder Exam   Right shoulder exam is normal.    Inspection/Observation   Swelling: absent  Bruising: absent  Scapular  Dyskinesia: positive    Tenderness   The patient is tender to palpation of the greater tuberosity, acromion and acromioclavicular joint.    Range of Motion   Active abduction:  abnormal   Passive abduction:  abnormal   Forward Flexion:  abnormal   Forward Elevation: abnormal  External Rotation 90 degrees: normal    Tests & Signs   Apprehension: negative  Cross arm: positive  Drop arm: negative  Alcocer test: positive  Impingement: positive  Rotator Cuff Painful Arc/Range: mild  Lag Sign 90 degrees: negative  Lift Off Sign: positive  Belly Press: positive  Active Compression Test ([place]'s Sign): positive  Jerk Test: negative    Other   Sensation: normal    Comments:  Pain with dynamic labral shear test.  There is pain and weakness with Whipple testing.     Left Shoulder Exam   Left shoulder exam is normal.    Inspection/Observation   Swelling: present  Scapular Dyskinesia: positive    Tenderness   The patient is tender to palpation of the acromioclavicular joint and biceps tendon.    Crepitus   The patient has crepitus of the AC joint and greater tuberosity    Range of Motion   Active abduction:  abnormal   Passive abduction:  abnormal   Forward Flexion:  abnormal     Tests & Signs   Cross arm: positive  Drop arm: negative  Alcocer test: positive  Impingement: positive  Sulcus: absent  Rotator Cuff Painful Arc/Range: mild  Anterosuperior Escape: negative  Lag Sign 0 degrees: negative  Lag Sign 90 degrees: negative  Lift Off Sign: positive  Belly Press: positive  Active Compression Test ([place]'s Sign): positive  Anterior Drawer Test: 0  Posterior Drawer Test: 0  Bear Hug: positive  Jerk Test: negative     Comments:  + Dynamic Labral Shear test  + Whipple Test that does not correct with scapular stabilization      Muscle Strength   Right Upper Extremity   Shoulder External Rotation: 4/5   Supraspinatus: 4/5   Subscapularis: 4/5   Biceps: 4/5   Left Upper Extremity  Shoulder Internal Rotation: 4/5   Shoulder  External Rotation: 4/5   Supraspinatus: 4/5   Subscapularis: 4/5   Biceps: 4/5     Reflexes     Right Side   Biceps:  2+  Right Davies's Sign:  absent    Vascular Exam     Right Pulses      Radial:                    2+      Capillary Refill  Right Hand: normal capillary refill      Positive thumb CMC grind test with tenderness over the thumb CMC joint bilaterally     IMAGING:  No results found.       ASSESSMENT:      ICD-10-CM ICD-9-CM   1. Osteoarthritis of right glenohumeral joint  M19.011 715.91   2. Bilateral shoulder pain, unspecified chronicity  M25.511 719.41    M25.512    3. Glenohumeral arthritis, left  M19.012 716.91   4. Arthritis of carpometacarpal (CMC) joint of right thumb  M18.11 716.94       PLAN:     -Findings and treatment options were discussed with the patient  -All questions answered  Natural history and expected course discussed. Questions answered.  Educational material distributed.  Shoulder injection. See procedure note.      There are no Patient Instructions on file for this visit.    Orders Placed This Encounter   Procedures    Large Joint Aspiration/Injection: L subacromial bursa    Large Joint Aspiration/Injection: R glenohumeral    Small Joint Aspiration/Injection: R thumb CMC       Large Joint Aspiration/Injection: L subacromial bursa    Date/Time: 7/27/2023 1:45 PM  Performed by: Silvio Frye MD  Authorized by: Silvio Frye MD     Consent Done?:  Yes (Verbal)  Indications:  Pain  Site marked: the procedure site was marked    Local anesthetic:  Bupivacaine 0.25% without epinephrine    Details:  Needle Size:  22 G  Approach:  Posterior  Location:  Shoulder  Site:  L subacromial bursa  Medications:  80 mg triamcinolone acetonide 40 mg/mL  Patient tolerance:  Patient tolerated the procedure well with no immediate complications  Small Joint Aspiration/Injection: R thumb CMC    Date/Time: 7/27/2023 1:45 PM  Performed by: Silvio Frye MD  Authorized by: Silvio Frye MD     Local  anesthetic:  Bupivacaine 0.25% without epinephrine  Location:  Thumb  Site:  R thumb CMC  Needle size:  25 G  Approach:  Dorsal  Medications:  20 mg triamcinolone acetonide 40 mg/mL

## 2023-07-27 NOTE — PROCEDURES
Large Joint Aspiration/Injection: R glenohumeral    Date/Time: 7/27/2023 1:45 PM  Performed by: Silvio Frye MD  Authorized by: Silvio Frye MD     Consent Done?:  Yes (Verbal)  Indications:  Pain  Timeout: prior to procedure the correct patient, procedure, and site was verified      Local anesthesia used?: Yes      Details:  Needle Size:  22 G  Approach:  Anterior  Location:  Shoulder  Site:  R glenohumeral  Medications:  5 mL BUPivacaine 0.5 % (5 mg/mL); 40 mg triamcinolone acetonide 40 mg/mL  Patient tolerance:  Patient tolerated the procedure well with no immediate complications

## 2023-11-29 ENCOUNTER — HOSPITAL ENCOUNTER (OUTPATIENT)
Dept: RADIOLOGY | Facility: HOSPITAL | Age: 63
Discharge: HOME | End: 2023-11-29
Attending: ORTHOPAEDIC SURGERY
Payer: COMMERCIAL

## 2023-11-29 ENCOUNTER — OFFICE VISIT (OUTPATIENT)
Dept: ORTHOPEDICS | Facility: CLINIC | Age: 63
End: 2023-11-29
Payer: COMMERCIAL

## 2023-11-29 DIAGNOSIS — M19.012 GLENOHUMERAL ARTHRITIS, LEFT: ICD-10-CM

## 2023-11-29 DIAGNOSIS — M25.511 BILATERAL SHOULDER PAIN, UNSPECIFIED CHRONICITY: ICD-10-CM

## 2023-11-29 DIAGNOSIS — M25.512 BILATERAL SHOULDER PAIN, UNSPECIFIED CHRONICITY: Primary | ICD-10-CM

## 2023-11-29 DIAGNOSIS — M25.511 BILATERAL SHOULDER PAIN, UNSPECIFIED CHRONICITY: Primary | ICD-10-CM

## 2023-11-29 DIAGNOSIS — M19.011 OSTEOARTHRITIS OF RIGHT GLENOHUMERAL JOINT: Primary | ICD-10-CM

## 2023-11-29 DIAGNOSIS — M25.512 BILATERAL SHOULDER PAIN, UNSPECIFIED CHRONICITY: ICD-10-CM

## 2023-11-29 PROCEDURE — 1160F PR REVIEW ALL MEDS BY PRESCRIBER/CLIN PHARMACIST DOCUMENTED: ICD-10-PCS | Mod: CPTII,,, | Performed by: ORTHOPAEDIC SURGERY

## 2023-11-29 PROCEDURE — 4010F PR ACE/ARB THEARPY RXD/TAKEN: ICD-10-PCS | Mod: CPTII,,, | Performed by: ORTHOPAEDIC SURGERY

## 2023-11-29 PROCEDURE — 99499 UNLISTED E&M SERVICE: CPT | Mod: S$PBB,,, | Performed by: ORTHOPAEDIC SURGERY

## 2023-11-29 PROCEDURE — 99213 OFFICE O/P EST LOW 20 MIN: CPT | Mod: PBBFAC | Performed by: ORTHOPAEDIC SURGERY

## 2023-11-29 PROCEDURE — 73030 X-RAY EXAM OF SHOULDER: CPT | Mod: TC,50

## 2023-11-29 PROCEDURE — 1160F RVW MEDS BY RX/DR IN RCRD: CPT | Mod: CPTII,,, | Performed by: ORTHOPAEDIC SURGERY

## 2023-11-29 PROCEDURE — 99499 NO LOS: ICD-10-PCS | Mod: S$PBB,,, | Performed by: ORTHOPAEDIC SURGERY

## 2023-11-29 PROCEDURE — 1159F PR MEDICATION LIST DOCUMENTED IN MEDICAL RECORD: ICD-10-PCS | Mod: CPTII,,, | Performed by: ORTHOPAEDIC SURGERY

## 2023-11-29 PROCEDURE — 20610 DRAIN/INJ JOINT/BURSA W/O US: CPT | Mod: PBBFAC | Performed by: ORTHOPAEDIC SURGERY

## 2023-11-29 PROCEDURE — 1159F MED LIST DOCD IN RCRD: CPT | Mod: CPTII,,, | Performed by: ORTHOPAEDIC SURGERY

## 2023-11-29 PROCEDURE — 99999PBSHW PR PBB SHADOW TECHNICAL ONLY FILED TO HB: Mod: PBBFAC,,,

## 2023-11-29 PROCEDURE — 20610 LARGE JOINT ASPIRATION/INJECTION: R GLENOHUMERAL: ICD-10-PCS | Mod: S$PBB,50,, | Performed by: ORTHOPAEDIC SURGERY

## 2023-11-29 PROCEDURE — 4010F ACE/ARB THERAPY RXD/TAKEN: CPT | Mod: CPTII,,, | Performed by: ORTHOPAEDIC SURGERY

## 2023-11-29 PROCEDURE — 99999PBSHW PR PBB SHADOW TECHNICAL ONLY FILED TO HB: ICD-10-PCS | Mod: PBBFAC,,,

## 2023-11-29 RX ADMIN — TRIAMCINOLONE ACETONIDE 80 MG: 400 INJECTION, SUSPENSION INTRA-ARTICULAR; INTRAMUSCULAR at 01:11

## 2023-11-29 RX ADMIN — BUPIVACAINE HYDROCHLORIDE 5 ML: 5 INJECTION, SOLUTION PERINEURAL at 01:11

## 2023-11-29 RX ADMIN — TRIAMCINOLONE ACETONIDE 40 MG: 400 INJECTION, SUSPENSION INTRA-ARTICULAR; INTRAMUSCULAR at 01:11

## 2023-11-29 NOTE — PROGRESS NOTES
Denny Leon returns to clinic for a follow up visit regarding     ICD-10-CM ICD-9-CM   1. Osteoarthritis of right glenohumeral joint  M19.011 715.91   2. Glenohumeral arthritis, left  M19.012 716.91       He had great results from his previous injections and is requesting more today.      PAST MEDICAL HISTORY:   Past Medical History:   Diagnosis Date    High cholesterol     Hypertension     Thyroid cancer      PAST SURGICAL HISTORY:   Past Surgical History:   Procedure Laterality Date    THYROID SURGERY      TOTAL KNEE ARTHROPLASTY Right     TUMOR REMOVAL Left     left thigh         PHYSICAL EXAMINATION:  General    Nursing note and vitals reviewed.  Constitutional: He is oriented to person, place, and time. He appears well-developed and well-nourished. No distress.   HENT:   Head: Normocephalic and atraumatic.   Nose: Nose normal.   Eyes: EOM are normal. Pupils are equal, round, and reactive to light.   Neck: Neck supple.   Cardiovascular:  Normal rate and intact distal pulses.            Pulmonary/Chest: Effort normal and breath sounds normal. No respiratory distress. He exhibits no tenderness.   Abdominal: Soft. Bowel sounds are normal. He exhibits no distension. There is no abdominal tenderness.   Neurological: He is alert and oriented to person, place, and time. He has normal reflexes. He displays normal reflexes. No cranial nerve deficit. He exhibits normal muscle tone.   Psychiatric: He has a normal mood and affect. His behavior is normal. Judgment and thought content normal.         Right Shoulder Exam     Inspection/Observation   Swelling: absent  Bruising: absent  Scapular Dyskinesia: positive    Tenderness   The patient is tender to palpation of the greater tuberosity, acromion and acromioclavicular joint.    Range of Motion   Active abduction:  abnormal   Passive abduction:  abnormal   Forward Flexion:  abnormal   Forward Elevation: abnormal  External Rotation 90 degrees: normal    Tests & Signs    Apprehension: negative  Cross arm: positive  Drop arm: negative  Alcocer test: positive  Impingement: positive  Rotator Cuff Painful Arc/Range: mild  Lag Sign 90 degrees: negative  Lift Off Sign: positive  Belly Press: positive  Active Compression Test ([place]'s Sign): positive  Jerk Test: negative    Other   Sensation: normal    Comments:  Pain with dynamic labral shear test.  There is pain and weakness with Whipple testing.     Left Shoulder Exam   Left shoulder exam is normal.    Tenderness   The patient is tender to palpation of the supraspinatus and biceps tendon.    Crepitus   The patient has crepitus of the acromion    Range of Motion   External Rotation 0 degrees:  normal   Internal rotation 0 degrees:  normal   Internal rotation 90 degrees:  normal     Tests & Signs   Apprehension: positive  Rotator Cuff Painful Arc/Range: moderate  Anterior Drawer Test: 2+  Relocation 90 degrees: positive  Jerk Test: positive       Muscle Strength   Right Upper Extremity   Shoulder External Rotation: 4/5   Supraspinatus: 4/5   Subscapularis: 4/5   Biceps: 4/5     Reflexes     Right Side   Biceps:  2+  Right Davies's Sign:  absent    Vascular Exam     Right Pulses      Radial:                    2+      Capillary Refill  Right Hand: normal capillary refill            IMAGING:  X-Ray Shoulder 2 or More views Bilateral    Result Date: 11/29/2023  See Procedure Notes for results. IMPRESSION: Please see Ortho procedure notes for report.  This procedure was auto-finalized by: Virtual [facility]         ASSESSMENT:      ICD-10-CM ICD-9-CM   1. Osteoarthritis of right glenohumeral joint  M19.011 715.91   2. Glenohumeral arthritis, left  M19.012 716.91       PLAN:     -Findings and treatment options were discussed with the patient  -All questions answered  Natural history and expected course discussed. Questions answered.  Educational material distributed.  Reduction in offending activity.  Discussed shoulder arthroplasty in  detail. Wishes to get one more round of injections prior to proceeding.     There are no Patient Instructions on file for this visit.    Orders Placed This Encounter   Procedures    Large Joint Aspiration/Injection: L subacromial bursa    Large Joint Aspiration/Injection: R glenohumeral       Procedures

## 2023-11-29 NOTE — PROCEDURES
Large Joint Aspiration/Injection: R glenohumeral    Date/Time: 11/29/2023 1:30 PM    Performed by: Silvio Frye MD  Authorized by: Silvio Frye MD    Consent Done?:  Yes (Verbal)  Indications:  Pain  Timeout: prior to procedure the correct patient, procedure, and site was verified      Local anesthesia used?: Yes      Details:  Needle Size:  22 G  Approach:  Anterior  Location:  Shoulder  Site:  R glenohumeral  Medications:  5 mL BUPivacaine 0.5 % (5 mg/mL); 40 mg triamcinolone acetonide 40 mg/mL  Patient tolerance:  Patient tolerated the procedure well with no immediate complications

## 2023-11-29 NOTE — PROCEDURES
Large Joint Aspiration/Injection: L subacromial bursa    Date/Time: 11/29/2023 1:30 PM    Performed by: Silvio Frye MD  Authorized by: Silvio Frye MD    Consent Done?:  Yes (Verbal)  Indications:  Pain  Site marked: the procedure site was marked    Local anesthetic:  Bupivacaine 0.25% without epinephrine    Details:  Needle Size:  22 G  Approach:  Posterior  Location:  Shoulder  Site:  L subacromial bursa  Medications:  80 mg triamcinolone acetonide 40 mg/mL  Patient tolerance:  Patient tolerated the procedure well with no immediate complications

## 2023-12-04 RX ORDER — TRIAMCINOLONE ACETONIDE 40 MG/ML
40 INJECTION, SUSPENSION INTRA-ARTICULAR; INTRAMUSCULAR
Status: DISCONTINUED | OUTPATIENT
Start: 2023-11-29 | End: 2023-12-04 | Stop reason: HOSPADM

## 2023-12-04 RX ORDER — TRIAMCINOLONE ACETONIDE 40 MG/ML
80 INJECTION, SUSPENSION INTRA-ARTICULAR; INTRAMUSCULAR
Status: DISCONTINUED | OUTPATIENT
Start: 2023-11-29 | End: 2023-12-04 | Stop reason: HOSPADM

## 2023-12-04 RX ORDER — BUPIVACAINE HYDROCHLORIDE 5 MG/ML
5 INJECTION, SOLUTION PERINEURAL
Status: DISCONTINUED | OUTPATIENT
Start: 2023-11-29 | End: 2023-12-04 | Stop reason: HOSPADM

## 2024-02-02 DIAGNOSIS — M19.011 OSTEOARTHRITIS OF RIGHT GLENOHUMERAL JOINT: Primary | ICD-10-CM

## 2024-02-13 ENCOUNTER — OFFICE VISIT (OUTPATIENT)
Dept: ORTHOPEDICS | Facility: CLINIC | Age: 64
End: 2024-02-13
Payer: COMMERCIAL

## 2024-02-13 ENCOUNTER — TELEPHONE (OUTPATIENT)
Dept: ORTHOPEDICS | Facility: CLINIC | Age: 64
End: 2024-02-13
Payer: COMMERCIAL

## 2024-02-13 ENCOUNTER — HOSPITAL ENCOUNTER (OUTPATIENT)
Dept: RADIOLOGY | Facility: HOSPITAL | Age: 64
Discharge: HOME | End: 2024-02-13
Attending: ORTHOPAEDIC SURGERY
Payer: COMMERCIAL

## 2024-02-13 DIAGNOSIS — M19.012 OSTEOARTHRITIS OF LEFT GLENOHUMERAL JOINT: ICD-10-CM

## 2024-02-13 DIAGNOSIS — M19.011 OSTEOARTHRITIS OF RIGHT GLENOHUMERAL JOINT: ICD-10-CM

## 2024-02-13 DIAGNOSIS — M19.012 OSTEOARTHRITIS OF GLENOHUMERAL JOINT, LEFT: Primary | ICD-10-CM

## 2024-02-13 PROCEDURE — 99212 OFFICE O/P EST SF 10 MIN: CPT | Mod: PBBFAC,25 | Performed by: ORTHOPAEDIC SURGERY

## 2024-02-13 PROCEDURE — 99214 OFFICE O/P EST MOD 30 MIN: CPT | Mod: S$PBB,,, | Performed by: ORTHOPAEDIC SURGERY

## 2024-02-13 PROCEDURE — 73200 CT UPPER EXTREMITY W/O DYE: CPT | Mod: 26,LT,, | Performed by: STUDENT IN AN ORGANIZED HEALTH CARE EDUCATION/TRAINING PROGRAM

## 2024-02-13 PROCEDURE — 73200 CT UPPER EXTREMITY W/O DYE: CPT | Mod: TC,LT

## 2024-02-13 PROCEDURE — 4010F ACE/ARB THERAPY RXD/TAKEN: CPT | Mod: CPTII,,, | Performed by: ORTHOPAEDIC SURGERY

## 2024-02-13 NOTE — TELEPHONE ENCOUNTER
CALL PATIENT TO LET HIM KNOW WE CAN RESCHEDULE HIS SURGERY TO 3/13.   PATIENT ALSO ASK IF HE CAN BE THE FIRST CASE THAT DAY. I TOLD HIM I WOULD PASS THAT INFO ALONG TO DR. CALZADA, BUT HE WOULD BE THE ONE TO MAKE THE FINAL DETERMINATION ON THE SCHEDULE BASED ON OTHER CASES.     ----- Message from Samanta Rodriguez sent at 2/13/2024  4:25 PM CST -----  Pt was just there. He wants to get March 13th surgery date instead.

## 2024-02-13 NOTE — H&P (VIEW-ONLY)
Denny Leon returns to clinic for a follow up visit regarding     ICD-10-CM ICD-9-CM   1. Osteoarthritis of glenohumeral joint, left  M19.012 715.91       States his last injection did not give him much relief. States he is ready to discuss surgery.      PAST MEDICAL HISTORY:   Past Medical History:   Diagnosis Date    High cholesterol     Hypertension     Thyroid cancer      PAST SURGICAL HISTORY:   Past Surgical History:   Procedure Laterality Date    THYROID SURGERY      TOTAL KNEE ARTHROPLASTY Right     TUMOR REMOVAL Left     left thigh         PHYSICAL EXAMINATION:  General    Constitutional: He is oriented to person, place, and time. He appears well-developed and well-nourished. No distress.   HENT:   Head: Normocephalic and atraumatic.   Eyes: Pupils are equal, round, and reactive to light.   Cardiovascular:  Normal rate, regular rhythm and intact distal pulses.            Pulmonary/Chest: Effort normal. No respiratory distress. He exhibits no tenderness.   Abdominal: Soft. Bowel sounds are normal. He exhibits no distension. There is no abdominal tenderness.   Neurological: He is alert and oriented to person, place, and time. He has normal reflexes. He displays normal reflexes. He exhibits normal muscle tone. Coordination normal.   Psychiatric: He has a normal mood and affect. His behavior is normal. Judgment and thought content normal.             Left Shoulder Exam     Tenderness   The patient is tender to palpation of the acromioclavicular joint and supraspinatus.    Tests & Signs   Drop arm: positive  Lag Sign 0 degrees: positive     Comments:  Patient demonstrates evidence of pseudoparalysis with limited active forward elevation      Muscle Strength   Left Upper Extremity  Shoulder Internal Rotation: 3/5   Shoulder External Rotation: 3/5   Supraspinatus: 3/5   Subscapularis: 3/5         IMAGING:  CT Shoulder Without Contrast Left    Result Date: 2/13/2024  EXAMINATION: CT SHOULDER WITHOUT CONTRAST  LEFT CLINICAL HISTORY: Shoulder pain, chronic, osteoarthritis suspected;RIGHT SHOULDER PAIN;Primary osteoarthritis, left shoulder TECHNIQUE: CT SHOULDER WITHOUT CONTRAST LEFT COMPARISON: 11/29/23 FINDINGS: Severe osteoarthrosis of the left-sided glenohumeral joint associated with joint space narrowing and subchondral cystic change of the glenoid and humeral head. Moderate degenerative change of the left-sided AC joint. The visualized lungs are clear.  The heart is mild-to-moderately enlarged.  Moderate to severe calcified vascular disease of the coronary arteries.  No fluid collection or soft tissue mass.     Severe osteoarthrosis of the left-sided glenohumeral joint associated with joint space narrowing and subchondral cystic change of the glenoid and humeral head. Moderate degenerative change of the left-sided AC joint. Electronically signed by: Sean Rae Date:    02/13/2024 Time:    16:26         ASSESSMENT:      ICD-10-CM ICD-9-CM   1. Osteoarthritis of glenohumeral joint, left  M19.012 715.91       PLAN:     -Findings and treatment options were discussed with the patient  -All questions answered  Natural history and expected course discussed. Questions answered.  Educational material distributed.  MRI findings were reviewed with the patient.  The patient has a symptomatic left shoulder glenohumeral osteoarthritis with a B2 glenoid and rotator cuff disease.  Would plan for left reverse shoulder arthroplasty to address the B2 glenoid and bypass the dysfunctional left rotator cuff  The patient understands the likely convalescence after surgery, in particular the expected postop rehab and recovery course. Alternatives both operative and non-operative with associated risks and benefits discussed. The outlined risks and potential complications of the proposed procedure include but are not limited to: infection, poor wound healing, scarring, stiffness, swelling, continued or recurrent pain, instability,  hardware or prosthetic failure, symptomatic hardware requiring removal, weakness, neurovascular injury, numbness, chronic regional pain disorder, tissue nonhealing/irreparability/retear, contralateral ligament injury, chondral injury, arthritis, fracture, blood clot formation, inability to return to previous level of activity, anesthetic or regional block complication up to death, need for additional procedure as indicated, and potential need for further surgery.     he was also informed and understands the risks of surgery are greater for patients with a current condition or history of heart disease, obesity, clotting disorders, recurrent infections, steroid use, current or past smoking, and factors such as sedentary lifestyle and noncompliance with medications, therapy or follow-up. The degree of the increased risk is hard to estimate with any degree of precision.    All questions were answered. The patient has verbalized understanding of these issues and wishes to proceed as discussed. The patient understands that recovery time can be up to 6-12 months.        There are no Patient Instructions on file for this visit.    No orders of the defined types were placed in this encounter.      Procedures                                  States

## 2024-02-16 DIAGNOSIS — M19.012 OSTEOARTHRITIS OF GLENOHUMERAL JOINT, LEFT: Primary | ICD-10-CM

## 2024-02-16 DIAGNOSIS — M19.012 OSTEOARTHRITIS OF LEFT GLENOHUMERAL JOINT: ICD-10-CM

## 2024-02-16 RX ORDER — TRANEXAMIC ACID 10 MG/ML
1000 INJECTION, SOLUTION INTRAVENOUS
Status: CANCELLED | OUTPATIENT
Start: 2024-02-16

## 2024-02-16 RX ORDER — SODIUM CHLORIDE 9 MG/ML
INJECTION, SOLUTION INTRAVENOUS CONTINUOUS
Status: CANCELLED | OUTPATIENT
Start: 2024-02-16

## 2024-02-16 RX ORDER — MUPIROCIN 20 MG/G
OINTMENT TOPICAL
Status: CANCELLED | OUTPATIENT
Start: 2024-02-16

## 2024-03-12 ENCOUNTER — TELEPHONE (OUTPATIENT)
Dept: ORTHOPEDICS | Facility: CLINIC | Age: 64
End: 2024-03-12
Payer: COMMERCIAL

## 2024-03-12 NOTE — TELEPHONE ENCOUNTER
----- Message from Samanta Rodriguez sent at 3/12/2024 11:34 AM CDT -----  Pt wants a call back please about his surg at [phone]. Wants to make sure everything is still on.     
Statement Selected

## 2024-03-12 NOTE — TELEPHONE ENCOUNTER
----- Message from Elke Tam sent at 3/12/2024  9:07 AM CDT -----  Please call Leti at Dr Georges's office @ [phone]. His thyroid numbers are off. Needing to speak with the nurse regarding his surgery.

## 2024-03-13 ENCOUNTER — HOSPITAL ENCOUNTER (OUTPATIENT)
Facility: HOSPITAL | Age: 64
Discharge: HOME | End: 2024-03-13
Attending: ORTHOPAEDIC SURGERY | Admitting: ORTHOPAEDIC SURGERY
Payer: COMMERCIAL

## 2024-03-13 ENCOUNTER — ANESTHESIA (OUTPATIENT)
Dept: SURGERY | Facility: HOSPITAL | Age: 64
End: 2024-03-13
Payer: COMMERCIAL

## 2024-03-13 ENCOUNTER — ANESTHESIA EVENT (OUTPATIENT)
Dept: SURGERY | Facility: HOSPITAL | Age: 64
End: 2024-03-13
Payer: COMMERCIAL

## 2024-03-13 VITALS
BODY MASS INDEX: 34.46 KG/M2 | DIASTOLIC BLOOD PRESSURE: 79 MMHG | TEMPERATURE: 98 F | SYSTOLIC BLOOD PRESSURE: 119 MMHG | WEIGHT: 260 LBS | HEART RATE: 94 BPM | RESPIRATION RATE: 19 BRPM | HEIGHT: 73 IN | OXYGEN SATURATION: 92 %

## 2024-03-13 DIAGNOSIS — M19.012 OSTEOARTHRITIS OF LEFT GLENOHUMERAL JOINT: Primary | ICD-10-CM

## 2024-03-13 DIAGNOSIS — M19.012 OSTEOARTHRITIS OF GLENOHUMERAL JOINT, LEFT: ICD-10-CM

## 2024-03-13 LAB
INDIRECT COOMBS: NORMAL
RH BLD: NORMAL
SPECIMEN OUTDATE: NORMAL

## 2024-03-13 PROCEDURE — 25000003 PHARM REV CODE 250: Performed by: NURSE ANESTHETIST, CERTIFIED REGISTERED

## 2024-03-13 PROCEDURE — 23430 REPAIR BICEPS TENDON: CPT | Mod: 59,LT,, | Performed by: ORTHOPAEDIC SURGERY

## 2024-03-13 PROCEDURE — 37000008 HC ANESTHESIA 1ST 15 MINUTES: Performed by: ORTHOPAEDIC SURGERY

## 2024-03-13 PROCEDURE — 86850 RBC ANTIBODY SCREEN: CPT | Performed by: ORTHOPAEDIC SURGERY

## 2024-03-13 PROCEDURE — 36000711: Performed by: ORTHOPAEDIC SURGERY

## 2024-03-13 PROCEDURE — 37000009 HC ANESTHESIA EA ADD 15 MINS: Performed by: ORTHOPAEDIC SURGERY

## 2024-03-13 PROCEDURE — 25000003 PHARM REV CODE 250: Performed by: ORTHOPAEDIC SURGERY

## 2024-03-13 PROCEDURE — 27000655: Performed by: ANESTHESIOLOGY

## 2024-03-13 PROCEDURE — 71000015 HC POSTOP RECOV 1ST HR: Performed by: ORTHOPAEDIC SURGERY

## 2024-03-13 PROCEDURE — 23472 RECONSTRUCT SHOULDER JOINT: CPT | Mod: LT,,, | Performed by: ORTHOPAEDIC SURGERY

## 2024-03-13 PROCEDURE — 27202344 HC EYESHIELD: Performed by: ANESTHESIOLOGY

## 2024-03-13 PROCEDURE — C1776 JOINT DEVICE (IMPLANTABLE): HCPCS | Performed by: ORTHOPAEDIC SURGERY

## 2024-03-13 PROCEDURE — D9220A PRA ANESTHESIA: Mod: CRNA,,, | Performed by: NURSE ANESTHETIST, CERTIFIED REGISTERED

## 2024-03-13 PROCEDURE — 27000716 HC OXISENSOR PROBE, ANY SIZE: Performed by: ANESTHESIOLOGY

## 2024-03-13 PROCEDURE — 27000165 HC TUBE, ETT CUFFED: Performed by: ANESTHESIOLOGY

## 2024-03-13 PROCEDURE — 71000016 HC POSTOP RECOV ADDL HR: Performed by: ORTHOPAEDIC SURGERY

## 2024-03-13 PROCEDURE — 71000033 HC RECOVERY, INTIAL HOUR: Performed by: ORTHOPAEDIC SURGERY

## 2024-03-13 PROCEDURE — D9220A PRA ANESTHESIA: Mod: ANES,,, | Performed by: ANESTHESIOLOGY

## 2024-03-13 PROCEDURE — C1769 GUIDE WIRE: HCPCS | Performed by: ORTHOPAEDIC SURGERY

## 2024-03-13 PROCEDURE — 27201423 OPTIME MED/SURG SUP & DEVICES STERILE SUPPLY: Performed by: ORTHOPAEDIC SURGERY

## 2024-03-13 PROCEDURE — 27000689 HC BLADE LARYNGOSCOPE ANY SIZE: Performed by: ANESTHESIOLOGY

## 2024-03-13 PROCEDURE — 63600175 PHARM REV CODE 636 W HCPCS: Performed by: NURSE ANESTHETIST, CERTIFIED REGISTERED

## 2024-03-13 PROCEDURE — 27200750 HC INSULATED NEEDLE/ STIMUPLEX: Performed by: ANESTHESIOLOGY

## 2024-03-13 PROCEDURE — 97161 PT EVAL LOW COMPLEX 20 MIN: CPT

## 2024-03-13 PROCEDURE — 27000510 HC BLANKET BAIR HUGGER ANY SIZE: Performed by: ANESTHESIOLOGY

## 2024-03-13 PROCEDURE — 63600175 PHARM REV CODE 636 W HCPCS: Mod: JZ,JG | Performed by: ANESTHESIOLOGY

## 2024-03-13 PROCEDURE — 25000003 PHARM REV CODE 250: Performed by: ANESTHESIOLOGY

## 2024-03-13 PROCEDURE — 64415 NJX AA&/STRD BRCH PLXS IMG: CPT | Mod: 59,LT,, | Performed by: ANESTHESIOLOGY

## 2024-03-13 PROCEDURE — C1713 ANCHOR/SCREW BN/BN,TIS/BN: HCPCS | Performed by: ORTHOPAEDIC SURGERY

## 2024-03-13 PROCEDURE — 36000710: Performed by: ORTHOPAEDIC SURGERY

## 2024-03-13 DEVICE — SCREW BONE GLENOID 4.5X24MM: Type: IMPLANTABLE DEVICE | Site: SHOULDER | Status: FUNCTIONAL

## 2024-03-13 DEVICE — GLENOID BASEPLATE
Type: IMPLANTABLE DEVICE | Site: SHOULDER | Status: FUNCTIONAL
Brand: REUNION

## 2024-03-13 DEVICE — SCREW PERIPH REUNION 4.5X16MM: Type: IMPLANTABLE DEVICE | Site: SHOULDER | Status: FUNCTIONAL

## 2024-03-13 DEVICE — SCREW BONE TSA 4.5X28MM: Type: IMPLANTABLE DEVICE | Site: SHOULDER | Status: FUNCTIONAL

## 2024-03-13 DEVICE — CUP HUMERAL REUN RSA 36X4MM: Type: IMPLANTABLE DEVICE | Site: SHOULDER | Status: FUNCTIONAL

## 2024-03-13 DEVICE — SCREW BONE CENTER 28X6.5MM: Type: IMPLANTABLE DEVICE | Site: SHOULDER | Status: FUNCTIONAL

## 2024-03-13 RX ORDER — EPHEDRINE SULFATE 50 MG/ML
INJECTION, SOLUTION INTRAVENOUS
Status: DISCONTINUED | OUTPATIENT
Start: 2024-03-13 | End: 2024-03-13

## 2024-03-13 RX ORDER — MEPERIDINE HYDROCHLORIDE 25 MG/ML
25 INJECTION INTRAMUSCULAR; INTRAVENOUS; SUBCUTANEOUS EVERY 10 MIN PRN
Status: DISCONTINUED | OUTPATIENT
Start: 2024-03-13 | End: 2024-03-13 | Stop reason: HOSPADM

## 2024-03-13 RX ORDER — ACETAMINOPHEN 10 MG/ML
1000 INJECTION, SOLUTION INTRAVENOUS ONCE
Status: DISCONTINUED | OUTPATIENT
Start: 2024-03-13 | End: 2024-03-13 | Stop reason: HOSPADM

## 2024-03-13 RX ORDER — ONDANSETRON 4 MG/1
8 TABLET, ORALLY DISINTEGRATING ORAL EVERY 8 HOURS PRN
Status: DISCONTINUED | OUTPATIENT
Start: 2024-03-13 | End: 2024-03-13 | Stop reason: HOSPADM

## 2024-03-13 RX ORDER — MUPIROCIN 20 MG/G
OINTMENT TOPICAL 2 TIMES DAILY
Status: DISCONTINUED | OUTPATIENT
Start: 2024-03-13 | End: 2024-03-13 | Stop reason: HOSPADM

## 2024-03-13 RX ORDER — FENTANYL CITRATE 50 UG/ML
INJECTION, SOLUTION INTRAMUSCULAR; INTRAVENOUS
Status: DISCONTINUED | OUTPATIENT
Start: 2024-03-13 | End: 2024-03-13

## 2024-03-13 RX ORDER — PROMETHAZINE HYDROCHLORIDE 25 MG/1
25 TABLET ORAL EVERY 6 HOURS PRN
Status: DISCONTINUED | OUTPATIENT
Start: 2024-03-13 | End: 2024-03-13 | Stop reason: HOSPADM

## 2024-03-13 RX ORDER — HYDROMORPHONE HYDROCHLORIDE 2 MG/ML
0.5 INJECTION, SOLUTION INTRAMUSCULAR; INTRAVENOUS; SUBCUTANEOUS EVERY 5 MIN PRN
Status: DISCONTINUED | OUTPATIENT
Start: 2024-03-13 | End: 2024-03-13 | Stop reason: HOSPADM

## 2024-03-13 RX ORDER — TRANEXAMIC ACID 100 MG/ML
INJECTION, SOLUTION INTRAVENOUS
Status: DISCONTINUED | OUTPATIENT
Start: 2024-03-13 | End: 2024-03-13

## 2024-03-13 RX ORDER — BUPIVACAINE HYDROCHLORIDE 5 MG/ML
INJECTION, SOLUTION EPIDURAL; INTRACAUDAL
Status: COMPLETED | OUTPATIENT
Start: 2024-03-13 | End: 2024-03-13

## 2024-03-13 RX ORDER — DEXAMETHASONE SODIUM PHOSPHATE 4 MG/ML
INJECTION, SOLUTION INTRA-ARTICULAR; INTRALESIONAL; INTRAMUSCULAR; INTRAVENOUS; SOFT TISSUE
Status: DISCONTINUED | OUTPATIENT
Start: 2024-03-13 | End: 2024-03-13

## 2024-03-13 RX ORDER — ROCURONIUM BROMIDE 10 MG/ML
INJECTION, SOLUTION INTRAVENOUS
Status: DISCONTINUED | OUTPATIENT
Start: 2024-03-13 | End: 2024-03-13

## 2024-03-13 RX ORDER — VECURONIUM BROMIDE FOR INJECTION 1 MG/ML
INJECTION, POWDER, LYOPHILIZED, FOR SOLUTION INTRAVENOUS
Status: DISCONTINUED | OUTPATIENT
Start: 2024-03-13 | End: 2024-03-13

## 2024-03-13 RX ORDER — TRANEXAMIC ACID 10 MG/ML
1000 INJECTION, SOLUTION INTRAVENOUS
Status: DISCONTINUED | OUTPATIENT
Start: 2024-03-13 | End: 2024-03-13 | Stop reason: HOSPADM

## 2024-03-13 RX ORDER — CEFAZOLIN SODIUM 1 G/3ML
INJECTION, POWDER, FOR SOLUTION INTRAMUSCULAR; INTRAVENOUS
Status: DISCONTINUED | OUTPATIENT
Start: 2024-03-13 | End: 2024-03-13

## 2024-03-13 RX ORDER — SCOLOPAMINE TRANSDERMAL SYSTEM 1 MG/1
1 PATCH, EXTENDED RELEASE TRANSDERMAL ONCE
Status: DISCONTINUED | OUTPATIENT
Start: 2024-03-13 | End: 2024-03-13 | Stop reason: HOSPADM

## 2024-03-13 RX ORDER — PROPOFOL 10 MG/ML
VIAL (ML) INTRAVENOUS
Status: DISCONTINUED | OUTPATIENT
Start: 2024-03-13 | End: 2024-03-13

## 2024-03-13 RX ORDER — SULFAMETHOXAZOLE AND TRIMETHOPRIM 800; 160 MG/1; MG/1
1 TABLET ORAL 2 TIMES DAILY
Qty: 4 TABLET | Refills: 0 | Status: SHIPPED | OUTPATIENT
Start: 2024-03-13 | End: 2024-03-15

## 2024-03-13 RX ORDER — OXYCODONE HYDROCHLORIDE 5 MG/1
5 TABLET ORAL EVERY 4 HOURS PRN
Status: DISCONTINUED | OUTPATIENT
Start: 2024-03-13 | End: 2024-03-13 | Stop reason: HOSPADM

## 2024-03-13 RX ORDER — ONDANSETRON HYDROCHLORIDE 2 MG/ML
INJECTION, SOLUTION INTRAVENOUS
Status: DISCONTINUED | OUTPATIENT
Start: 2024-03-13 | End: 2024-03-13

## 2024-03-13 RX ORDER — MUPIROCIN 20 MG/G
OINTMENT TOPICAL
Status: DISCONTINUED | OUTPATIENT
Start: 2024-03-13 | End: 2024-03-13 | Stop reason: HOSPADM

## 2024-03-13 RX ORDER — SODIUM CHLORIDE 0.9 % (FLUSH) 0.9 %
2 SYRINGE (ML) INJECTION
Status: DISCONTINUED | OUTPATIENT
Start: 2024-03-13 | End: 2024-03-13 | Stop reason: HOSPADM

## 2024-03-13 RX ORDER — ACETAMINOPHEN 500 MG
1000 TABLET ORAL ONCE
Status: COMPLETED | OUTPATIENT
Start: 2024-03-13 | End: 2024-03-13

## 2024-03-13 RX ORDER — DOCUSATE SODIUM 100 MG/1
100 CAPSULE, LIQUID FILLED ORAL 2 TIMES DAILY
Status: DISCONTINUED | OUTPATIENT
Start: 2024-03-13 | End: 2024-03-13 | Stop reason: HOSPADM

## 2024-03-13 RX ORDER — PHENYLEPHRINE HYDROCHLORIDE 10 MG/ML
INJECTION INTRAVENOUS
Status: DISCONTINUED | OUTPATIENT
Start: 2024-03-13 | End: 2024-03-13

## 2024-03-13 RX ORDER — HYDROMORPHONE HYDROCHLORIDE 2 MG/ML
INJECTION, SOLUTION INTRAMUSCULAR; INTRAVENOUS; SUBCUTANEOUS
Status: DISCONTINUED | OUTPATIENT
Start: 2024-03-13 | End: 2024-03-13

## 2024-03-13 RX ORDER — ONDANSETRON HYDROCHLORIDE 2 MG/ML
4 INJECTION, SOLUTION INTRAVENOUS DAILY PRN
Status: DISCONTINUED | OUTPATIENT
Start: 2024-03-13 | End: 2024-03-13 | Stop reason: HOSPADM

## 2024-03-13 RX ORDER — OXYCODONE AND ACETAMINOPHEN 10; 325 MG/1; MG/1
1 TABLET ORAL EVERY 6 HOURS PRN
Qty: 30 TABLET | Refills: 0 | Status: SHIPPED | OUTPATIENT
Start: 2024-03-13

## 2024-03-13 RX ORDER — TRAMADOL HYDROCHLORIDE 50 MG/1
50 TABLET ORAL EVERY 6 HOURS
Qty: 30 TABLET | Refills: 0 | Status: SHIPPED | OUTPATIENT
Start: 2024-03-13

## 2024-03-13 RX ORDER — MORPHINE SULFATE 10 MG/ML
4 INJECTION INTRAMUSCULAR; INTRAVENOUS; SUBCUTANEOUS EVERY 5 MIN PRN
Status: DISCONTINUED | OUTPATIENT
Start: 2024-03-13 | End: 2024-03-13 | Stop reason: HOSPADM

## 2024-03-13 RX ORDER — SODIUM CHLORIDE 9 MG/ML
INJECTION, SOLUTION INTRAVENOUS CONTINUOUS
Status: DISCONTINUED | OUTPATIENT
Start: 2024-03-13 | End: 2024-03-13 | Stop reason: HOSPADM

## 2024-03-13 RX ORDER — DIPHENHYDRAMINE HYDROCHLORIDE 50 MG/ML
25 INJECTION INTRAMUSCULAR; INTRAVENOUS EVERY 6 HOURS PRN
Status: DISCONTINUED | OUTPATIENT
Start: 2024-03-13 | End: 2024-03-13 | Stop reason: HOSPADM

## 2024-03-13 RX ORDER — OXYCODONE HYDROCHLORIDE 5 MG/1
10 TABLET ORAL EVERY 4 HOURS PRN
Status: DISCONTINUED | OUTPATIENT
Start: 2024-03-13 | End: 2024-03-13 | Stop reason: HOSPADM

## 2024-03-13 RX ORDER — ONDANSETRON 4 MG/1
4 TABLET, ORALLY DISINTEGRATING ORAL EVERY 8 HOURS PRN
Qty: 30 TABLET | Refills: 0 | Status: SHIPPED | OUTPATIENT
Start: 2024-03-13

## 2024-03-13 RX ORDER — MIDAZOLAM HYDROCHLORIDE 1 MG/ML
INJECTION INTRAMUSCULAR; INTRAVENOUS
Status: DISCONTINUED | OUTPATIENT
Start: 2024-03-13 | End: 2024-03-13

## 2024-03-13 RX ORDER — LIDOCAINE HYDROCHLORIDE 20 MG/ML
INJECTION, SOLUTION EPIDURAL; INFILTRATION; INTRACAUDAL; PERINEURAL
Status: DISCONTINUED | OUTPATIENT
Start: 2024-03-13 | End: 2024-03-13

## 2024-03-13 RX ADMIN — PHENYLEPHRINE HYDROCHLORIDE 100 MCG: 10 INJECTION INTRAVENOUS at 09:03

## 2024-03-13 RX ADMIN — SUGAMMADEX 200 MG: 100 INJECTION, SOLUTION INTRAVENOUS at 11:03

## 2024-03-13 RX ADMIN — SODIUM CHLORIDE: 9 INJECTION, SOLUTION INTRAVENOUS at 12:03

## 2024-03-13 RX ADMIN — PHENYLEPHRINE HYDROCHLORIDE 200 MCG: 10 INJECTION INTRAVENOUS at 10:03

## 2024-03-13 RX ADMIN — TRANEXAMIC ACID 1000 MG: 100 INJECTION, SOLUTION INTRAVENOUS at 09:03

## 2024-03-13 RX ADMIN — VECURONIUM BROMIDE 2 MG: 1 INJECTION, POWDER, LYOPHILIZED, FOR SOLUTION INTRAVENOUS at 10:03

## 2024-03-13 RX ADMIN — BUPIVACAINE HYDROCHLORIDE 13 ML: 5 INJECTION, SOLUTION EPIDURAL; INTRACAUDAL; PERINEURAL at 09:03

## 2024-03-13 RX ADMIN — DEXAMETHASONE SODIUM PHOSPHATE 8 MG: 4 INJECTION, SOLUTION INTRA-ARTICULAR; INTRALESIONAL; INTRAMUSCULAR; INTRAVENOUS; SOFT TISSUE at 09:03

## 2024-03-13 RX ADMIN — HYDROMORPHONE HYDROCHLORIDE 2 MG: 2 INJECTION, SOLUTION INTRAMUSCULAR; INTRAVENOUS; SUBCUTANEOUS at 11:03

## 2024-03-13 RX ADMIN — PHENYLEPHRINE HYDROCHLORIDE 200 MCG: 10 INJECTION INTRAVENOUS at 11:03

## 2024-03-13 RX ADMIN — SODIUM CHLORIDE: 9 INJECTION, SOLUTION INTRAVENOUS at 08:03

## 2024-03-13 RX ADMIN — VANCOMYCIN HYDROCHLORIDE 1500 MG: 1 INJECTION, POWDER, LYOPHILIZED, FOR SOLUTION INTRAVENOUS at 09:03

## 2024-03-13 RX ADMIN — EPHEDRINE SULFATE 25 MG: 50 INJECTION INTRAVENOUS at 09:03

## 2024-03-13 RX ADMIN — PHENYLEPHRINE HYDROCHLORIDE 200 MCG: 10 INJECTION INTRAVENOUS at 09:03

## 2024-03-13 RX ADMIN — SCOPOLAMINE 1 PATCH: 1.5 PATCH, EXTENDED RELEASE TRANSDERMAL at 08:03

## 2024-03-13 RX ADMIN — FENTANYL CITRATE 100 MCG: 50 INJECTION INTRAMUSCULAR; INTRAVENOUS at 08:03

## 2024-03-13 RX ADMIN — ONDANSETRON 4 MG: 2 INJECTION INTRAMUSCULAR; INTRAVENOUS at 09:03

## 2024-03-13 RX ADMIN — LIDOCAINE HYDROCHLORIDE 40 MG: 20 INJECTION, SOLUTION INTRAVENOUS at 09:03

## 2024-03-13 RX ADMIN — ROCURONIUM BROMIDE 10 MG: 10 INJECTION, SOLUTION INTRAVENOUS at 10:03

## 2024-03-13 RX ADMIN — PROMETHAZINE HYDROCHLORIDE 25 MG: 25 TABLET ORAL at 01:03

## 2024-03-13 RX ADMIN — ACETAMINOPHEN 1000 MG: 500 TABLET ORAL at 08:03

## 2024-03-13 RX ADMIN — PROPOFOL 150 MG: 10 INJECTION, EMULSION INTRAVENOUS at 09:03

## 2024-03-13 RX ADMIN — ROCURONIUM BROMIDE 40 MG: 10 INJECTION, SOLUTION INTRAVENOUS at 09:03

## 2024-03-13 RX ADMIN — CEFAZOLIN 2 G: 1 INJECTION, POWDER, FOR SOLUTION INTRAMUSCULAR; INTRAVENOUS; PARENTERAL at 09:03

## 2024-03-13 RX ADMIN — MIDAZOLAM 2 MG: 1 INJECTION INTRAMUSCULAR; INTRAVENOUS at 08:03

## 2024-03-13 NOTE — PLAN OF CARE
NAME:_________________________________    DATE: _________________________________    PHYSICIAN:____________________________                             DR. VISHAL CALZADA        Reverse Total Shoulder Arthroplasty                                                                                        Post-operative Protocol    *Regular sling for 2 days-5 days  **No Pendulums      Phase 1 - (QUIET)  No Internal Rotation   Week 1-2        Phase 2 - (ACTIVE-ASSIST) Active-Assist Range of Motion with Passive Stretch to prescribed limits  Week 2-6   Supine-Seated External Rotation - Gradually increase to full   Supine-Seated Forward Elevation - Progress to Seated   Internal Rotation - Gradually increase to full       Phase 3 - (RESISTED)  NO Pendulums, continue with phase 2.  Week 7     Scapular mobilization        External and Internal Rotation       Standing forward punch      Seated rows       Shoulder Shrugs       Bicep Curls      Bear Hugs       Concentrate on Deltoid strengthening        Weight Training  Keep hands within eyesight, keep elbows bent  Week 12    Minimize overhead activities      (No  press, pull-down behind head, or wide  bench)      Return to Activities    Computer  4 weeks  Golf   3 months  Tennis   4 months

## 2024-03-13 NOTE — PLAN OF CARE
Problem: Physical Therapy  Goal: Physical Therapy Goal  Description: Short term goals     Patient and family will demonstrate/verbalize competence with illustrated HEP; shoulder abduction brace management, cryotherapy management, dressing technique maintaining PROM of the affected shoulder    Long term goals  Patient will regain full independent functional mobility once outpatient PT completed.   Outcome: Met     Patient and spouse competent with post-op care for d/c home today. Patient persistently nauseated but reported some improvement after vomiting. Patient and spouse report patient has this experience after every surgery.

## 2024-03-13 NOTE — OP NOTE
[facility]  OPERATIVE REPORT   ORTHOPAEDIC SURGERY   PROVIDER: DR. SILVIO FRYE MD    PATIENT INFORMATION   Denny Leon 63 y.o. male 1960   MRN: [id number]   LOCATION: [facility]    DATE OF PROCEDURE: 3/13/2024     PREOPERATIVE DIAGNOSES:   Osteoarthritis of glenohumeral joint, left [M19.012]    POSTOPERATIVE DIAGNOSES:   Osteoarthritis of glenohumeral joint, left [M19.012]    PROCEDURES PERFORMED:   Left  reverse shoulder arthroplasty (CPT 08821)  Left shoulder open biceps tenodesis (CPT 81376)    Surgeon(s) and Role:     * Silvio Frye MD - Primary    ANESTHESIA: General + interscalene block    ESTIMATED BLOOD LOSS: less than 50 mL    IMPLANTS:   Implant Name Type Inv. Item Serial No.  Lot No. LRB No. Used Action   PIN 3.2MM 4 DORITA SQUARE - [id number]  PIN 3.2MM 4 DORITA SQUARE  [facility]  Left 1 Implanted and Explanted   WIRE FIXATION REUN NIT PILT - [id number]  WIRE FIXATION REUN NIT PILT  DEBRA SALES ARNULFO. [id number] Left 1 Implanted and Explanted   BASEPLATE REUNION FOZIA [id number] - [id number]  BASEPLATE REUNION FOZIA [id number]  DEBRA SALES ARNULFO. [id number] Left 1 Implanted   SCREW BONE CENTER 28X6.5MM - [id number]  SCREW BONE CENTER 28X6.5MM  DEBRA SALES ARNULFO. [id number] Left 1 Implanted   SCREW PERIPH REUNION 4.5X16MM - [id number]  SCREW PERIPH REUNION 4.5X16MM  DEBRA SALES ARNULFO. [id number] Left 1 Implanted   SCREW BONE TSA 4.5X28MM - [id number]  SCREW BONE TSA 4.5X28MM  DEBRA SALES ARNULFO. [id number] Left 1 Implanted   SCREW BONE GLENOID 4.5X24MM - [id number]  SCREW BONE GLENOID 4.5X24MM  DEBRA SALES ARNULFO. [id number] Left 1 Implanted   DEBRA REUNION RSA 2MM ECCENTRIC GLENOSPHERE    DEBRA [id number] Left 1 Implanted   CUP HUMERAL REUN RSA 36X4MM - [id number]  CUP HUMERAL REUN RSA 36X4MM  DEBRA SALES ARNULFO. [id number] Left 1 Implanted   DEBRA REUNION RSA X3 HUMERAL INSERT     [id number] Left 1 Implanted   DEBRA REUNION S PRESS-FIT HUMERAL STEM    DEBRA  [id number] Left 1 Implanted        FINDINGS: Massive chronic rotator cuff tear with degenerative changes.    SPECIMENS:   Specimen (24h ago, onward)      None            COMPLICATIONS: None.     INTRAOPERATIVE COUNTS: Correct.     PROPHYLACTIC IV ANTIBIOTICS: Given per [facility] Protocol.    INDICATIONS FOR OPERATION: Denny Leon 63 y.o. male has been seen and evaluated in the office and found to havelifestyle-limiting pain secondary to advanced shoulder arthropathy. The patient has both pain and weakness and inability to forward flex the shoulder.  After a lengthy discussion with the patient , the patient elected to proceed with surgical intervention. The patient was extensively counseled and  fully informed of risks and benefits.    DESCRIPTION OF PROCEDURE:     The patient was taken to the operating room and placed in the beach chair position.  Anesthesia was administered and pre-operative antibiotics were given.  A timeout was performed.  Patient was positioned appropriately and prepped and draped in the usual sterile fashion with the operative arm suspended in a Spider Arm faustin. A standard deltopectoral incision was then undertaken to the operative extremity. The skin was cut, and then bovie electrocautery was used on the skin edges to control bleeding and reduce the bacterial burden of p. Acnes.  The cephalic vein was identified and mobilized laterally.  The deltopectoral interval was thus exposed.  A Kolbel self-retaining retractor was placed.  The clavipectoral fascia was identified and released.  The leading edge of the pectoralis major tendon was partially released, thus exposing the biceps tendon.  The biceps was grasped with an Allis clamp, and a No. 5 ethibond suture was used to perform a tenodesis of the biceps tendon to the adjacent pec major tendon.  The biceps was then cut at this point following a secure tenodesis.  Attention was then turned towards releasing the adhesions present in the subacromial  space and subdeltoid space.  With proper releases performed, the biceps was tracked proximally to identify the biceps groove and adjacent subscapularis.  The anterior humeral circumflex vessels were identified and ligated. A subscapularis peel was performed and the subscapularis was tagged with a No 2 suture.  A curved Andrew was placed on the medial border of the humerus to facilitate a release of the capsule.  This helped to dislocate the humeral head and afford exposure of the arthritic head.       Rotator cuff tearing of the supraspinatus was not  clearly evident.       A Darrach retractor was placed posterior to the humeral head.  Humeral head osteophytes were removed with a rangeur.  An extramedullary guide was used to make the head cut in between 20-30 degrees of retroversion, utilizing the supraspinatus tendon as the superior endpoint of the cut.  The cuff was protected as the cut was made.  The head cut was then sized on the back table.  The canal was then prepared in the standard fashion with a canal finder, reamer, and broach until appropriate stability had been achieved with the trial component.  The trial was left in place and attention was turned towards glenoid preparation. The subscapularis was inspected and adhesions were released anterior and posterior to this.  The axillary nerve was palpated as well.  With the nerve protected, a curved dickens scissors was used to release the middle glenohumeral ligament and superior glenohumeral ligament.  A lighted retractor was placed on the anterior edge of the glenoid.  A radial cut was made in the labrum and a circumferential labral takedown was performed.  Retractors were placed posteriorly and anterosuperiorly to improve exposure.      The glenoid wear pattern was B2     The glenoid was then prepared for the standard baseplate which was placed and secured with the center screw and 3 peripheral locking screws.  The  glenosphere was then impacted onto the  baseplate.    Trialing was performed off the humeral side and a final stem was placed. The final humeral socket was impacted onto the stem. The joint was reduced, showed excellent stability and motion.      Dilute betadine wash followed by normal saline was used to irrigate the wound. V. Tranexamic acid was given intravenously before incision to limit blood loss.      The wounds were copiously irrigated and closed in the standard fashion.  Sterile dressing was applied with a Polar Care and shoulder abduction pillow sling.  Patient was  taken to recovery room.    POSTOPERATIVE PLAN: The patient will be dc'ed to home on RTSA protocol

## 2024-03-13 NOTE — PT/OT/SLP EVAL
"Physical Therapy Evaluation and Discharge Note    Patient Name:  Denny Leon   MRN:  [id number]    Recommendations:     Discharge Recommendations: Low Intensity Therapy (once cleared by Dr Frye)  Discharge Equipment Recommendations: none   Barriers to discharge: None    Assessment:     Denny Leon is a 63 y.o. male admitted with a medical diagnosis of Osteoarthritis of glenohumeral joint, left. .  At this time, patient and spouse demo competence with post-op care for d/c home. Patient persistently nauseated but reported some improvement after vomiting. Patient does not require further acute PT services and will follow up with OPPT once cleared by Dr Frye.     Recent Surgery: Procedure(s) (LRB):  ARTHROPLASTY, SHOULDER, TOTAL, REVERSE (Left) Day of Surgery    Plan:     During this hospitalization, patient does not require further acute PT services.  Please re-consult if situation changes.      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  Equipment used at home:  (Left AFO).  DME owned (not currently used): none.  Upon discharge, patient will have assistance from spouse and son.    Objective:     Communicated with Felipa Dutta RN prior to session.  Patient found HOB elevated with peripheral IV, blood pressure cuff, pulse ox (continuous) upon PT entry to room.    General Precautions: Standard, fall    Orthopedic Precautions:LUE non weight bearing   Braces: Shoulder " abduction brace  Respiratory Status: Room air    Exams:  Cognitive Exam:  Patient is oriented to Person, Place, Time, and Situation  Sensation:    -       Impaired  block in effect Left UE  RUE ROM: WNL  RUE Strength: WNL  LUE ROM: Deficits: shoulder not tested due to block and reverse TSR today; elbow and wrist PROM WFL during dressing activity  LUE Strength: Deficits: 0/5  RLE ROM: WNL  RLE Strength: WNL  LLE ROM: WNL  LLE Strength: Deficits: hip WFL; knee WFL; ankle chronic foot drop    Functional Mobility:  Bed Mobility:     Supine to Sit: minimum assistance  Transfers:     Sit to Stand:  minimum assistance with hand-held assist  Bed to Chair: minimum assistance with  hand-held assist  using  Step Transfer  Further gait deferred due to pt persistent nausea and onset of vomiting.    AM-PAC 6 CLICK MOBILITY  Total Score:18       Treatment and Education:  Patient and spouse ed on dressing technique, brace management; use of ice and pain meds for pain management; outpatient PT once cleared by Dr Frye.    AM-PAC 6 CLICK MOBILITY  Total Score:18     Patient left  sitting in the wheel chair  with Felipa Dutta RN and spouse present.    GOALS:   Multidisciplinary Problems       Physical Therapy Goals       Not on file              Multidisciplinary Problems (Resolved)          Problem: Physical Therapy    Goal Priority Disciplines Outcome Goal Variances Interventions   Physical Therapy Goal   (Resolved)     PT, PT/OT Met     Description: Short term goals     Patient and family will demonstrate/verbalize competence with illustrated HEP; shoulder abduction brace management, cryotherapy management, dressing technique maintaining PROM of the affected shoulder    Long term goals  Patient will regain full independent functional mobility once outpatient PT completed.                        History:     Past Medical History:   Diagnosis Date    High cholesterol     Hypertension     Thyroid cancer        Past Surgical  History:   Procedure Laterality Date    THYROID SURGERY      TOTAL KNEE ARTHROPLASTY Right     TUMOR REMOVAL Left     left thigh       Time Tracking:     PT Received On: 03/13/24  PT Start Time: 1401     PT Stop Time: 1427  PT Total Time (min): 26 min     Billable Minutes: Evaluation Low complexity      03/13/2024

## 2024-03-13 NOTE — ANESTHESIA PREPROCEDURE EVALUATION
03/13/2024  Denny Leon is a 63 y.o., male.      Pre-op Assessment    I have reviewed the Patient Summary Reports.    I have reviewed the NPO Status.   I have reviewed the Medications.     Review of Systems         Anesthesia Plan  Type of Anesthesia, risks & benefits discussed:    Anesthesia Type: Gen ETT, Regional  Intra-op Monitoring Plan: Standard ASA Monitors  Post Op Pain Control Plan: multimodal analgesia  Induction:  IV  Informed Consent: Informed consent signed with the Patient and all parties understand the risks and agree with anesthesia plan.  All questions answered.   ASA Score: 3    Ready For Surgery From Anesthesia Perspective.     .  NPO greater than 8 hours  PONV  NKDA    HTN  CAD (stents)  H/o MI  S/p thyroidectomy    Airway exam deferred (COVID precautions); adequate ROM at neck.    Plan is GETA plus interscalene block

## 2024-03-13 NOTE — ANESTHESIA PROCEDURE NOTES
Peripheral Block    Patient location during procedure: OR   Block not for primary anesthetic.  Reason for block: at surgeon's request and post-op pain management   Post-op Pain Location: Left shoulder   Start time: 3/13/2024 8:58 AM  Timeout: 3/13/2024 8:55 AM   End time: 3/13/2024 9:02 AM    Staffing  Authorizing Provider: Martín Yanez MD  Performing Provider: Martín Yanez MD    Staffing  Performed by: Martín Yanez MD  Authorized by: Martín Yanez MD    Preanesthetic Checklist  Completed: patient identified, risks and benefits discussed, pre-op evaluation and timeout performed  Peripheral Block  Patient position: supine  Prep: ChloraPrep  Block type: interscalene  Laterality: left  Injection technique: single shot  Needle  Needle localization: nerve stimulator     Assessment  Injection assessment: negative aspiration    Medications:    Medications: BUPivacaine (pf) (MARCAINE) injection 0.5% - Perineural   13 mL - 3/13/2024 9:02:00 AM    Additional Notes  Exparel 133 mg plus 0.5% bupivicaine 65 mg.   No complications.

## 2024-03-18 NOTE — ANESTHESIA POSTPROCEDURE EVALUATION
Anesthesia Post Evaluation    Patient: Denny Leon    Procedure(s) Performed: Procedure(s) (LRB):  ARTHROPLASTY, SHOULDER, TOTAL, REVERSE (Left)    Final Anesthesia Type: general      Patient location during evaluation: PACU  Post-procedure vital signs: reviewed and stable  Pain management: adequate  Airway patency: patent  MYLES mitigation strategies: Multimodal analgesia  PONV status at discharge: No PONV  Anesthetic complications: no      Cardiovascular status: hemodynamically stable  Respiratory status: unassisted  Hydration status: euvolemic  Follow-up not needed.              Vitals Value Taken Time   /67 03/13/24 1401   Temp 36.6 °C (97.8 °F) 03/13/24 1157   Pulse 94 03/13/24 1401   Resp 18 03/13/24 1242   SpO2 88 % 03/13/24 1401   Vitals shown include unvalidated device data.      Event Time   Out of Recovery 12:44:25         Pain/Miranda Score: No data recorded

## 2024-03-21 ENCOUNTER — OFFICE VISIT (OUTPATIENT)
Dept: ORTHOPEDICS | Facility: CLINIC | Age: 64
End: 2024-03-21
Payer: COMMERCIAL

## 2024-03-21 DIAGNOSIS — Z96.612 S/P REVERSE TOTAL SHOULDER ARTHROPLASTY, LEFT: ICD-10-CM

## 2024-03-21 DIAGNOSIS — Z47.89 ORTHOPEDIC AFTERCARE: Primary | ICD-10-CM

## 2024-03-21 PROCEDURE — 99024 POSTOP FOLLOW-UP VISIT: CPT | Mod: ,,, | Performed by: NURSE PRACTITIONER

## 2024-03-21 PROCEDURE — 99212 OFFICE O/P EST SF 10 MIN: CPT | Mod: PBBFAC | Performed by: NURSE PRACTITIONER

## 2024-03-21 PROCEDURE — 4010F ACE/ARB THERAPY RXD/TAKEN: CPT | Mod: CPTII,,, | Performed by: NURSE PRACTITIONER

## 2024-03-21 NOTE — PROGRESS NOTES
Post-Operative Shoulder Arthroplasty      No surgery found No surgery found      Pt is here today for First post-operative followup of his No surgery found.  he is doing well.  We have reviewed his findings and discussed plan of care and future treatment options, including the physical therapy plan.      Patient is 8 days postop left reverse total shoulder arthroplasty, doing well.  His incision looks good today.  He has not yet started physical therapy.  We will set that up today.  No signs of infection around the incision.  He has worked out of his arm sling.    Physical Exam:     The affected shoulder was inspected today.   The wound is healing well with no signs of erythema or warmth.  There is no drainage.  No clinical signs or symptoms of infection are present. Sensation is well maintained in the axillary nerve distribution.  NVI in the upper extremity.  No neurovascular deficits seen  ROM:  Passive forward flexion 0-50, external rotation 0-10            There are no diagnoses linked to this encounter.      We will continue post-operative physical therapy until the patient feels that they are independent with a home rehab program.  Will continue with our schedule post op plan of care.   Return to work status/ return to activities status was discussed today in detail.   All questions were answered.      The use of stockings and DVT prophylaxis was discussed.   Will follow up in additional 4 weeks with radiographs at that time.  We will follow up with Dr. Frye.  Reviewed plan of care in detail.    There are no Patient Instructions on file for this visit.

## 2024-04-17 DIAGNOSIS — Z96.612 S/P REVERSE TOTAL SHOULDER ARTHROPLASTY, LEFT: Primary | ICD-10-CM

## 2024-04-18 ENCOUNTER — HOSPITAL ENCOUNTER (OUTPATIENT)
Dept: RADIOLOGY | Facility: HOSPITAL | Age: 64
Discharge: HOME | End: 2024-04-18
Attending: ORTHOPAEDIC SURGERY
Payer: COMMERCIAL

## 2024-04-18 ENCOUNTER — OFFICE VISIT (OUTPATIENT)
Dept: ORTHOPEDICS | Facility: CLINIC | Age: 64
End: 2024-04-18
Payer: COMMERCIAL

## 2024-04-18 DIAGNOSIS — Z96.612 S/P REVERSE TOTAL SHOULDER ARTHROPLASTY, LEFT: ICD-10-CM

## 2024-04-18 DIAGNOSIS — Z96.612 S/P REVERSE TOTAL SHOULDER ARTHROPLASTY, LEFT: Primary | ICD-10-CM

## 2024-04-18 DIAGNOSIS — M54.9 BACK PAIN, UNSPECIFIED BACK LOCATION, UNSPECIFIED BACK PAIN LATERALITY, UNSPECIFIED CHRONICITY: ICD-10-CM

## 2024-04-18 PROCEDURE — 99024 POSTOP FOLLOW-UP VISIT: CPT | Mod: ,,, | Performed by: ORTHOPAEDIC SURGERY

## 2024-04-18 PROCEDURE — 73030 X-RAY EXAM OF SHOULDER: CPT | Mod: TC,LT

## 2024-04-18 PROCEDURE — 99213 OFFICE O/P EST LOW 20 MIN: CPT | Mod: PBBFAC,25 | Performed by: ORTHOPAEDIC SURGERY

## 2024-04-18 PROCEDURE — 1159F MED LIST DOCD IN RCRD: CPT | Mod: CPTII,,, | Performed by: ORTHOPAEDIC SURGERY

## 2024-04-18 PROCEDURE — 4010F ACE/ARB THERAPY RXD/TAKEN: CPT | Mod: CPTII,,, | Performed by: ORTHOPAEDIC SURGERY

## 2024-04-18 PROCEDURE — 73030 X-RAY EXAM OF SHOULDER: CPT | Mod: 26,LT,, | Performed by: ORTHOPAEDIC SURGERY

## 2024-04-18 RX ORDER — METHYLPREDNISOLONE 4 MG/1
TABLET ORAL
Qty: 21 EACH | Refills: 0 | Status: SHIPPED | OUTPATIENT
Start: 2024-04-18 | End: 2024-05-09

## 2024-04-18 NOTE — PROGRESS NOTES
Post-Operative Shoulder Arthroplasty      Arthroplasty, Shoulder, Total, Reverse - Left Arthroplasty, Shoulder, Total, Reverse - Left       Pt is here today for Second post-operative followup of his Arthroplasty, Shoulder, Total, Reverse - Left.      he is doing well.      We have reviewed his findings and discussed plan of care and future treatment options, including the physical therapy plan.      Patient is 5 weeks post surgery and doing good.    Physical Exam:     The affected shoulder was inspected today.   The wound is healing well with no signs of erythema or warmth.  There is no drainage.  No clinical signs or symptoms of infection are present. Sensation is well maintained in the axillary nerve distribution.  NVI in the upper extremity.  No neurovascular deficits seen  ROM: 117      X-Ray Shoulder 2 or More Views Left    Result Date: 4/18/2024  See Procedure Notes for results. IMPRESSION: Please see Ortho procedure notes for report.  This procedure was auto-finalized by: Virtual Radiologist   3 X-rays of the left shoulder were taken today. Reverse shoulder prosthesis appears to be in excellent alignment. The stem is well seated. No evidence of loosening.         Denny was seen today for post-op evaluation.    Diagnoses and all orders for this visit:    S/P reverse total shoulder arthroplasty, left    Other orders  -     methylPREDNISolone (MEDROL DOSEPACK) 4 mg tablet; use as directed          We will continue post-operative physical therapy until the patient feels that they are independent with a home rehab program.  Will continue with our schedule post op plan of care.   Return to work status/ return to activities status was discussed today in detail.   All questions were answered.      The use of stockings and DVT prophylaxis was discussed.   Will follow up in additional 4-6 weeks with radiographs at that time.     There are no Patient Instructions on file for this visit.

## 2024-05-29 DIAGNOSIS — Z96.612 S/P REVERSE TOTAL SHOULDER ARTHROPLASTY, LEFT: Primary | ICD-10-CM

## 2024-06-25 DIAGNOSIS — Z96.612 S/P REVERSE TOTAL SHOULDER ARTHROPLASTY, LEFT: Primary | ICD-10-CM

## 2024-07-02 ENCOUNTER — OFFICE VISIT (OUTPATIENT)
Dept: ORTHOPEDICS | Facility: CLINIC | Age: 64
End: 2024-07-02
Payer: COMMERCIAL

## 2024-07-02 ENCOUNTER — HOSPITAL ENCOUNTER (OUTPATIENT)
Dept: RADIOLOGY | Facility: HOSPITAL | Age: 64
Discharge: HOME | End: 2024-07-02
Attending: ORTHOPAEDIC SURGERY
Payer: COMMERCIAL

## 2024-07-02 DIAGNOSIS — Z96.612 S/P REVERSE TOTAL SHOULDER ARTHROPLASTY, LEFT: ICD-10-CM

## 2024-07-02 DIAGNOSIS — Z96.612 S/P REVERSE TOTAL SHOULDER ARTHROPLASTY, LEFT: Primary | ICD-10-CM

## 2024-07-02 DIAGNOSIS — M19.011 OSTEOARTHRITIS OF RIGHT GLENOHUMERAL JOINT: ICD-10-CM

## 2024-07-02 PROCEDURE — 20610 DRAIN/INJ JOINT/BURSA W/O US: CPT | Mod: PBBFAC | Performed by: ORTHOPAEDIC SURGERY

## 2024-07-02 PROCEDURE — 73030 X-RAY EXAM OF SHOULDER: CPT | Mod: TC,LT

## 2024-07-02 PROCEDURE — 99999PBSHW PR PBB SHADOW TECHNICAL ONLY FILED TO HB: Mod: PBBFAC,,,

## 2024-07-02 PROCEDURE — 99212 OFFICE O/P EST SF 10 MIN: CPT | Mod: PBBFAC,25 | Performed by: ORTHOPAEDIC SURGERY

## 2024-07-02 PROCEDURE — 73030 X-RAY EXAM OF SHOULDER: CPT | Mod: 26,LT,, | Performed by: ORTHOPAEDIC SURGERY

## 2024-07-02 PROCEDURE — 99999 PR PBB SHADOW E&M-EST. PATIENT-LVL II: CPT | Mod: PBBFAC,,, | Performed by: ORTHOPAEDIC SURGERY

## 2024-07-02 RX ORDER — BUPIVACAINE HYDROCHLORIDE 5 MG/ML
5 INJECTION, SOLUTION PERINEURAL
Status: DISCONTINUED | OUTPATIENT
Start: 2024-07-02 | End: 2024-07-02 | Stop reason: HOSPADM

## 2024-07-02 RX ORDER — TRIAMCINOLONE ACETONIDE 40 MG/ML
40 INJECTION, SUSPENSION INTRA-ARTICULAR; INTRAMUSCULAR
Status: DISCONTINUED | OUTPATIENT
Start: 2024-07-02 | End: 2024-07-02 | Stop reason: HOSPADM

## 2024-07-02 RX ADMIN — BUPIVACAINE HYDROCHLORIDE 5 ML: 5 INJECTION, SOLUTION PERINEURAL at 02:07

## 2024-07-02 RX ADMIN — TRIAMCINOLONE ACETONIDE 40 MG: 400 INJECTION, SUSPENSION INTRA-ARTICULAR; INTRAMUSCULAR at 02:07

## 2024-07-02 NOTE — PROCEDURES
Large Joint Aspiration/Injection: R glenohumeral    Date/Time: 7/2/2024 2:00 PM    Performed by: Silvio Frye MD  Authorized by: Silvio Frye MD    Consent Done?:  Yes (Verbal)  Indications:  Pain  Timeout: prior to procedure the correct patient, procedure, and site was verified      Local anesthesia used?: Yes      Details:  Needle Size:  22 G  Approach:  Anterior  Location:  Shoulder  Site:  R glenohumeral  Medications:  5 mL BUPivacaine 0.5 % (5 mg/mL); 40 mg triamcinolone acetonide 40 mg/mL  Patient tolerance:  Patient tolerated the procedure well with no immediate complications

## 2024-07-02 NOTE — PROGRESS NOTES
Denny Leon returns to clinic for a follow up visit regarding     ICD-10-CM ICD-9-CM   1. S/P reverse total shoulder arthroplasty, left  Z96.612 V43.61   2. Osteoarthritis of right glenohumeral joint  M19.011 715.91             PAST MEDICAL HISTORY:   Past Medical History:   Diagnosis Date    High cholesterol     Hypertension     Thyroid cancer      PAST SURGICAL HISTORY:   Past Surgical History:   Procedure Laterality Date    REVERSE TOTAL SHOULDER ARTHROPLASTY Left 3/13/2024    Procedure: ARTHROPLASTY, SHOULDER, TOTAL, REVERSE;  Surgeon: Silvio Frye MD;  Location: [facility];  Service: Orthopedics;  Laterality: Left;    THYROID SURGERY      TOTAL KNEE ARTHROPLASTY Right     TUMOR REMOVAL Left     left thigh         PHYSICAL EXAMINATION:  General    Nursing note and vitals reviewed.  Constitutional: He is oriented to person, place, and time. He appears well-developed and well-nourished. No distress.   HENT:   Head: Normocephalic and atraumatic.   Neck: Neck supple.   Cardiovascular:  Normal rate, regular rhythm and intact distal pulses.            Pulmonary/Chest: Effort normal. No respiratory distress. He exhibits no tenderness.   Abdominal: Soft. He exhibits no distension. There is no abdominal tenderness.   Neurological: He is alert and oriented to person, place, and time. He has normal reflexes. He displays normal reflexes. No cranial nerve deficit. He exhibits normal muscle tone.   Psychiatric: He has a normal mood and affect. His behavior is normal. Judgment and thought content normal.         Right Shoulder Exam     Inspection/Observation   Swelling: present  Scapular Dyskinesia: positive    Tenderness   The patient is tender to palpation of the acromioclavicular joint, supraspinatus, greater tuberosity and biceps tendon.    Crepitus   The patient has crepitus of the AC joint and greater tuberosity.    Range of Motion   Active abduction:  abnormal   Extension:  abnormal   Forward Flexion:   abnormal   Forward Elevation: abnormal  Adduction: abnormal    Tests & Signs   Cross arm: positive  Drop arm: positive  Alcocer test: positive  Impingement: positive  Sulcus: absent  Rotator Cuff Painful Arc/Range: moderate  Anterosuperior Escape: negative  Lag Sign 90 degrees: positive  Lift Off Sign: positive  Belly Press: positive  Bear Hug: positive  Jerk Test: negative    Other   Sensation: normal    Comments:  Patient demonstrates evidence of pseudoparalysis with limited active forward elevation    Left Shoulder Exam   Left shoulder exam is normal.       Muscle Strength   Right Upper Extremity   Supraspinatus: 3/5   Subscapularis: 3/5   Biceps: 4/5     Reflexes     Right Side   Right Davies's Sign:  absent    Vascular Exam     Right Pulses      Radial:                    2+      FF to 180, ER 60  IR to L5        IMAGING:  X-Ray Shoulder 2 or More Views Left    Result Date: 7/2/2024  See Procedure Notes for results. IMPRESSION: Please see Ortho procedure notes for report.  This procedure was auto-finalized by: Virtual Radiologist    3 X-rays of the left shoulder were taken today. Reverse shoulder prosthesis appears to be in excellent alignment. The stem is well seated. No evidence of loosening.       ASSESSMENT:      ICD-10-CM ICD-9-CM   1. S/P reverse total shoulder arthroplasty, left  Z96.612 V43.61   2. Osteoarthritis of right glenohumeral joint  M19.011 715.91       PLAN:     -Findings and treatment options were discussed with the patient  -All questions answered  Natural history and expected course discussed. Questions answered.  Educational material distributed.  Reduction in offending activity.    See back in 3 months       There are no Patient Instructions on file for this visit.    Orders Placed This Encounter   Procedures    Large Joint Aspiration/Injection: R glenohumeral       Procedures

## 2024-10-02 DIAGNOSIS — Z96.612 S/P REVERSE TOTAL SHOULDER ARTHROPLASTY, LEFT: Primary | ICD-10-CM

## 2024-12-11 DIAGNOSIS — M25.511 RIGHT SHOULDER PAIN, UNSPECIFIED CHRONICITY: Primary | ICD-10-CM

## 2024-12-12 ENCOUNTER — OFFICE VISIT (OUTPATIENT)
Dept: ORTHOPEDICS | Facility: CLINIC | Age: 64
End: 2024-12-12
Payer: COMMERCIAL

## 2024-12-12 ENCOUNTER — HOSPITAL ENCOUNTER (OUTPATIENT)
Dept: RADIOLOGY | Facility: HOSPITAL | Age: 64
Discharge: HOME | End: 2024-12-12
Attending: ORTHOPAEDIC SURGERY
Payer: COMMERCIAL

## 2024-12-12 DIAGNOSIS — M25.511 RIGHT SHOULDER PAIN, UNSPECIFIED CHRONICITY: ICD-10-CM

## 2024-12-12 DIAGNOSIS — Z96.612 S/P REVERSE TOTAL SHOULDER ARTHROPLASTY, LEFT: ICD-10-CM

## 2024-12-12 DIAGNOSIS — M19.011 OSTEOARTHRITIS OF RIGHT GLENOHUMERAL JOINT: Primary | ICD-10-CM

## 2024-12-12 PROCEDURE — 20610 DRAIN/INJ JOINT/BURSA W/O US: CPT | Mod: PBBFAC,RT | Performed by: ORTHOPAEDIC SURGERY

## 2024-12-12 PROCEDURE — 99999 PR PBB SHADOW E&M-EST. PATIENT-LVL II: CPT | Mod: PBBFAC,,, | Performed by: ORTHOPAEDIC SURGERY

## 2024-12-12 PROCEDURE — 99999PBSHW PR PBB SHADOW TECHNICAL ONLY FILED TO HB: Mod: PBBFAC,,,

## 2024-12-12 PROCEDURE — 73030 X-RAY EXAM OF SHOULDER: CPT | Mod: TC,50

## 2024-12-12 PROCEDURE — 99212 OFFICE O/P EST SF 10 MIN: CPT | Mod: PBBFAC,25 | Performed by: ORTHOPAEDIC SURGERY

## 2024-12-12 RX ADMIN — BUPIVACAINE HYDROCHLORIDE 5 ML: 5 INJECTION, SOLUTION PERINEURAL at 01:12

## 2024-12-12 RX ADMIN — TRIAMCINOLONE ACETONIDE 40 MG: 400 INJECTION, SUSPENSION INTRA-ARTICULAR; INTRAMUSCULAR at 01:12

## 2024-12-12 NOTE — PROGRESS NOTES
HPI:   Denny Leon is a pleasant 64 y.o. patient who reports to clinic for evaluation of right shoulder pain. Patient has a well-documented history of osteoarthritis in the right shoulder. He has received intermittent corticosteroid injections in the past with good relief. His last injection was > 3 months ago. He feels this is controlling symptoms at this time.     Patient is also s/p left reverse TSA performed on 03/13/2024. Patient completed formal physical therapy with good progress. He has some limitation in internal rotation, but overall has good ROM.     PAST MEDICAL HISTORY:   Past Medical History:   Diagnosis Date    High cholesterol     Hypertension     Thyroid cancer      PAST SURGICAL HISTORY:   Past Surgical History:   Procedure Laterality Date    REVERSE TOTAL SHOULDER ARTHROPLASTY Left 3/13/2024    Procedure: ARTHROPLASTY, SHOULDER, TOTAL, REVERSE;  Surgeon: Silvio Frye MD;  Location: [facility];  Service: Orthopedics;  Laterality: Left;    THYROID SURGERY      TOTAL KNEE ARTHROPLASTY Right     TUMOR REMOVAL Left     left thigh     MEDICATIONS:    Current Outpatient Medications:     amLODIPine (NORVASC) 5 MG tablet, amlodipine 5 mg tablet  TAKE ONE TABLET BY MOUTH EVERY NIGHT AT BEDTIME FOR BLOOD PRESSURE, Disp: , Rfl:     AMLODIPINE-ATORVASTATIN ORAL, Take by mouth., Disp: , Rfl:     aspirin (ECOTRIN) 81 MG EC tablet, Take 81 mg by mouth., Disp: , Rfl:     cefdinir (OMNICEF) 300 MG capsule, Take 300 mg by mouth 2 (two) times daily., Disp: , Rfl:     celecoxib (CELEBREX ORAL), Take by mouth., Disp: , Rfl:     clopidogreL (PLAVIX) 75 mg tablet, Take 75 mg by mouth once daily., Disp: , Rfl:     cyclobenzaprine HCl (FLEXERIL ORAL), Take by mouth., Disp: , Rfl:     diclofenac sodium (VOLTAREN) 1 % Gel, Voltaren Arthritis Pain 1 % topical gel  APPLY 2 GRAMS TO THE AFFECTED AREA(S) BY TOPICAL ROUTE 4 TIMES PER DAY, Disp: , Rfl:     evolocumab (REPATHA SURECLICK SUBQ), Inject into the skin.,  Disp: , Rfl:     fluticasone propionate (FLONASE) 50 mcg/actuation nasal spray, fluticasone propionate 50 mcg/actuation nasal spray,suspension  SPRAY TWICE IN EACH NOSTRIL EVERY DAY, Disp: , Rfl:     gabapentin (NEURONTIN) 300 MG capsule, gabapentin 300 mg capsule  TAKE ONE CAPSULE BY MOUTH EVERY NIGHT AT BEDTIME, Disp: , Rfl:     levothyroxine sodium (SYNTHROID ORAL), Take by mouth., Disp: , Rfl:     LORazepam (ATIVAN) 0.5 MG tablet, Take 0.5 mg by mouth every 12 (twelve) hours as needed., Disp: , Rfl:     losartan potassium (LOSARTAN ORAL), Take by mouth., Disp: , Rfl:     metoprolol tartrate (LOPRESSOR) 50 MG tablet, Take 50 mg by mouth 2 (two) times daily., Disp: , Rfl:     multivitamin (ONE DAILY MULTIVITAMIN) per tablet, Take 1 tablet by mouth once daily., Disp: , Rfl:     omeprazole (PRILOSEC) 40 MG capsule, omeprazole 40 mg capsule,delayed release  TAKE ONE CAPSULE BY MOUTH EVERY NIGHT AT BEDTIME FOR STOMACH, Disp: , Rfl:     ondansetron (ZOFRAN-ODT) 4 MG TbDL, Take 1 tablet (4 mg total) by mouth every 8 (eight) hours as needed (Nausea)., Disp: 30 tablet, Rfl: 0    oxyCODONE-acetaminophen (PERCOCET)  mg per tablet, Take 1 tablet by mouth every 6 (six) hours as needed for Pain., Disp: 30 tablet, Rfl: 0    traMADoL (ULTRAM) 50 mg tablet, tramadol 50 mg tablet  Take 1 tablet every 8 hours by oral route as needed., Disp: , Rfl:     traMADoL (ULTRAM) 50 mg tablet, Take 1 tablet (50 mg total) by mouth every 6 (six) hours., Disp: 30 tablet, Rfl: 0    triamcinolone acetonide (KENALOG) 40 mg/mL injection, 40 mg., Disp: , Rfl:   ALLERGIES:   Review of patient's allergies indicates:  No Known Allergies  REVIEW OF SYSTEMS:  Constitution: Negative. Negative for chills, fever and night sweats. HENT: Negative for congestion and headaches.  Eyes: Negative for blurred vision, left vision loss and right vision loss. Cardiovascular: Negative for chest pain and syncope. Respiratory: Negative for cough and shortness of  breath.       PHYSICAL EXAM:  VITAL SIGNS: There were no vitals taken for this visit.  General: Well-developed well-nourished 64 y.o. malein no acute distress;Cardiovascular: Regular rhythm by palpation of distal pulse, normal color and temperature, no concerning varicosities on symptomatic side Lungs: No labored breathing or wheezing appreciated Neuro: Alert and oriented ×3 Psychiatric: well oriented to person, place and time, demonstrates normal mood and affect Skin: No rashes, lesions or ulcers, normal temperature, turgor, and texture on uninvolved extremity    General    Nursing note and vitals reviewed.  Constitutional: He is oriented to person, place, and time. He appears well-developed and well-nourished. No distress.   HENT:   Head: Normocephalic and atraumatic.   Neck: Neck supple.   Cardiovascular:  Normal rate, regular rhythm and intact distal pulses.            Pulmonary/Chest: Effort normal and breath sounds normal. No respiratory distress. He exhibits no tenderness.   Abdominal: Soft. He exhibits no distension. There is no abdominal tenderness. There is no rebound.   Neurological: He is alert and oriented to person, place, and time. He has normal reflexes. He displays normal reflexes. No cranial nerve deficit. He exhibits normal muscle tone.   Psychiatric: He has a normal mood and affect. His behavior is normal. Judgment and thought content normal.         Right Shoulder Exam     Inspection/Observation   Swelling: present  Scapular Dyskinesia: positive    Tenderness   The patient is tender to palpation of the acromioclavicular joint, supraspinatus, greater tuberosity and biceps tendon.    Crepitus   The patient has crepitus of the AC joint and greater tuberosity.    Range of Motion   Active abduction:  abnormal   Extension:  abnormal   Forward Flexion:  abnormal   Forward Elevation: abnormal  Adduction: abnormal    Tests & Signs   Cross arm: positive  Drop arm: positive  Alcocer test:  positive  Impingement: positive  Sulcus: absent  Rotator Cuff Painful Arc/Range: moderate  Anterosuperior Escape: negative  Lag Sign 90 degrees: positive  Lift Off Sign: positive  Belly Press: positive  Bear Hug: positive  Jerk Test: negative    Other   Sensation: normal    Comments:  Patient demonstrates evidence of pseudoparalysis with limited active forward elevation    Left Shoulder Exam   Left shoulder exam is normal.       Muscle Strength   Right Upper Extremity   Supraspinatus: 3/5   Subscapularis: 3/5   Biceps: 4/5   Left Upper Extremity  Shoulder External Rotation: 5/5   Supraspinatus: 5/5     Reflexes     Right Side   Right Davies's Sign:  absent    Vascular Exam     Right Pulses      Radial:                    2+          IMAGING:  X-Ray Shoulder 2 or More views Bilateral    Result Date: 12/12/2024  See Procedure Notes for results. IMPRESSION: Please see Ortho procedure notes for report.  This procedure was auto-finalized by: Virtual Radiologist    Views left shoulder were obtained today demonstrating a stable appearing reverse shoulder prosthesis in excellent alignment     Three views right shoulder demonstrate severe degenerative changes of the glenohumeral joint    ASSESSMENT:      ICD-10-CM ICD-9-CM   1. Osteoarthritis of right glenohumeral joint  M19.011 715.91   2. S/P reverse total shoulder arthroplasty, left  Z96.612 V43.61       PLAN:     -Findings and treatment options were discussed with the patient  -All questions answered  Left reverse shoulder doing excellent.  He is ready to go hurting he wants to get an injection in his right shoulder injection was given today please see the attached procedure note.  I am going to see him back in the next 3-4 months to repeat x-rays of his right shoulder and left shoulder and discuss treatment options going forward    There are no Patient Instructions on file for this visit.  Orders Placed This Encounter   Procedures    Large Joint Aspiration/Injection: R  glenohumeral     This order was created via procedure documentation     Procedures

## 2024-12-12 NOTE — PROCEDURES
Large Joint Aspiration/Injection: R glenohumeral    Date/Time: 12/12/2024 1:30 PM    Performed by: Silvio Frye MD  Authorized by: Silvio Frye MD    Consent Done?:  Yes (Verbal)  Indications:  Pain  Timeout: prior to procedure the correct patient, procedure, and site was verified      Local anesthesia used?: Yes      Details:  Needle Size:  22 G  Approach:  Anterior  Location:  Shoulder  Site:  R glenohumeral  Medications:  5 mL BUPivacaine 0.5 % (5 mg/mL); 40 mg triamcinolone acetonide 40 mg/mL  Patient tolerance:  Patient tolerated the procedure well with no immediate complications

## 2024-12-13 RX ORDER — BUPIVACAINE HYDROCHLORIDE 5 MG/ML
5 INJECTION, SOLUTION PERINEURAL
Status: DISCONTINUED | OUTPATIENT
Start: 2024-12-12 | End: 2024-12-13 | Stop reason: HOSPADM

## 2024-12-13 RX ORDER — TRIAMCINOLONE ACETONIDE 40 MG/ML
40 INJECTION, SUSPENSION INTRA-ARTICULAR; INTRAMUSCULAR
Status: DISCONTINUED | OUTPATIENT
Start: 2024-12-12 | End: 2024-12-13 | Stop reason: HOSPADM

## (undated) DEVICE — SPONGE COTTON TRAY 4X4IN

## (undated) DEVICE — DRAPE ORTH SPLIT 77X108IN

## (undated) DEVICE — BLADE SAW SGTTL 18.6X.8X73.8MM

## (undated) DEVICE — BIT DRILL 3.1MM

## (undated) DEVICE — DRESSING OPSITE TRANS 11X6IN

## (undated) DEVICE — GLOVE SENSICARE PI GRN 7.5

## (undated) DEVICE — SUT #2 TI-CRON HGS-21 30IN

## (undated) DEVICE — GLOVE SENSICARE PI GRN 6.5

## (undated) DEVICE — SUT MONOCRYL 3-0 PS-2 UND

## (undated) DEVICE — BANDAGE COHES LTX TAN 4INX5YD

## (undated) DEVICE — STAPLER SKIN WIDE

## (undated) DEVICE — DRAPE INVISISHIELD U 48X52IN

## (undated) DEVICE — SUT TICRON #5

## (undated) DEVICE — GLOVE 7.0 PROTEXIS PI BLUE

## (undated) DEVICE — Device

## (undated) DEVICE — SUT 2-0 12-18IN SILK

## (undated) DEVICE — ADHESIVE DERMABOND ADVANCED

## (undated) DEVICE — NITINOL PILOT WIRE
Type: IMPLANTABLE DEVICE | Site: SHOULDER | Status: NON-FUNCTIONAL
Brand: REUNION
Removed: 2024-03-13

## (undated) DEVICE — DRAPE STERI INSTRUMENT 1018

## (undated) DEVICE — NDL QUINCKE SPINAL 18G 3.5IN

## (undated) DEVICE — PIN 3.2MM 4 KANT SQUARE
Type: IMPLANTABLE DEVICE | Site: SHOULDER | Status: NON-FUNCTIONAL
Removed: 2024-03-13

## (undated) DEVICE — CLEANER CAUT TIP STRL 2X2IN

## (undated) DEVICE — OVERLAY MATTRESS WAFFLE

## (undated) DEVICE — GOWN TOGA SYS PEELWY ZIP 2 XL

## (undated) DEVICE — SUT VICRYL 2-0 36 CT-1

## (undated) DEVICE — DRAPE TIBURON ORTHOPEDIC SPLIT

## (undated) DEVICE — SET CYSTO IRR DRP CHMBR 84IN

## (undated) DEVICE — SOL NACL IRR 1000ML BTL

## (undated) DEVICE — GLOVE SENSICARE PI GRN 6

## (undated) DEVICE — RETRACTOR WAVEGUIDE NAR/FLAT

## (undated) DEVICE — STOCKINETTE IMPERVIOUS LG 9X48

## (undated) DEVICE — GLOVE SENSICARE PI MICRO 6

## (undated) DEVICE — SPONGE LAP XRAY 5/PK 12X12IN

## (undated) DEVICE — GLOVE 6.5 PROTEXIS PI BLUE

## (undated) DEVICE — PAD ABDOMINAL 8X7.5 STERILE

## (undated) DEVICE — KIT IRR SUCTION HND PIECE

## (undated) DEVICE — APPLICATOR CHLORAPREP ORN 26ML

## (undated) DEVICE — GLOVE BIOGEL SKINSENSE PI 7.0

## (undated) DEVICE — PACK BASIC

## (undated) DEVICE — GLOVE SENSICARE PI MICRO 7.5

## (undated) DEVICE — SPACESUIT TOGA T5 ZIPPER PEEL

## (undated) DEVICE — KIT STABILIZATION SHOULDER SPIDER2

## (undated) DEVICE — DRESSING XEROFORM NONADH 1X8IN

## (undated) DEVICE — SUT BLU BR 2 TAPERD NDL 1/2

## (undated) DEVICE — GLOVE SENSICARE PI MICRO 6.5

## (undated) DEVICE — DRAPE INVISISHIELD TWL 18X24IN